# Patient Record
Sex: MALE | Race: WHITE | HISPANIC OR LATINO | Employment: FULL TIME | URBAN - METROPOLITAN AREA
[De-identification: names, ages, dates, MRNs, and addresses within clinical notes are randomized per-mention and may not be internally consistent; named-entity substitution may affect disease eponyms.]

---

## 2017-01-16 ENCOUNTER — TELEPHONE (OUTPATIENT)
Dept: MEDICAL GROUP | Facility: CLINIC | Age: 37
End: 2017-01-16

## 2017-01-16 ENCOUNTER — OFFICE VISIT (OUTPATIENT)
Dept: MEDICAL GROUP | Facility: CLINIC | Age: 37
End: 2017-01-16
Payer: COMMERCIAL

## 2017-01-16 VITALS
TEMPERATURE: 97.9 F | RESPIRATION RATE: 14 BRPM | OXYGEN SATURATION: 96 % | SYSTOLIC BLOOD PRESSURE: 120 MMHG | WEIGHT: 153 LBS | DIASTOLIC BLOOD PRESSURE: 72 MMHG | HEIGHT: 63 IN | HEART RATE: 65 BPM | BODY MASS INDEX: 27.11 KG/M2

## 2017-01-16 DIAGNOSIS — Z28.21 INFLUENZA VACCINATION DECLINED: ICD-10-CM

## 2017-01-16 DIAGNOSIS — K29.00 ACUTE GASTRITIS WITHOUT HEMORRHAGE, UNSPECIFIED GASTRITIS TYPE: ICD-10-CM

## 2017-01-16 DIAGNOSIS — R42 EPISODIC LIGHTHEADEDNESS: ICD-10-CM

## 2017-01-16 PROCEDURE — 99214 OFFICE O/P EST MOD 30 MIN: CPT | Performed by: NURSE PRACTITIONER

## 2017-01-16 RX ORDER — CHLORAL HYDRATE 500 MG
1000 CAPSULE ORAL
COMMUNITY

## 2017-01-16 RX ORDER — OMEPRAZOLE 20 MG/1
20 CAPSULE, DELAYED RELEASE ORAL DAILY
Qty: 30 CAP | Refills: 11 | Status: SHIPPED | OUTPATIENT
Start: 2017-01-16 | End: 2017-01-16 | Stop reason: CLARIF

## 2017-01-16 RX ORDER — OMEPRAZOLE 40 MG/1
40 CAPSULE, DELAYED RELEASE ORAL DAILY
Qty: 30 CAP | Refills: 11 | Status: SHIPPED | OUTPATIENT
Start: 2017-01-16 | End: 2019-07-09

## 2017-01-16 ASSESSMENT — PATIENT HEALTH QUESTIONNAIRE - PHQ9: CLINICAL INTERPRETATION OF PHQ2 SCORE: 2

## 2017-01-16 NOTE — MR AVS SNAPSHOT
"        Phuc Hopeiceto   2017 9:20 AM   Office Visit   MRN: [id number]    Department:  [facility]   Dept Phone:  [phone]    Description:  Male : 1980   Provider:  NIMA Reardon           Reason for Visit     Pain stomach painx 2days [id number]      Allergies as of 2017     No Known Allergies      You were diagnosed with     Acute gastritis without hemorrhage, unspecified gastritis type   [7377518]       Influenza vaccination declined   [275473]         Vital Signs     Blood Pressure Pulse Temperature Respirations Height Weight    120/72 mmHg 65 36.6 °C (97.9 °F) 14 1.6 m (5' 3\") 69.4 kg (153 lb)    Body Mass Index Oxygen Saturation Smoking Status             27.11 kg/m2 96% Never Smoker          Basic Information     Date Of Birth Sex Race Ethnicity Preferred Language    1980 Male White  Origin ([place]oz[place]ican,[place]imbabwean,[place], etc) English      Health Maintenance        Date Due Completion Dates    IMM DTaP/Tdap/Td Vaccine (1 - Tdap) 2000 ---    IMM INFLUENZA (1) 2016 ---            Current Immunizations     No immunizations on file.      Below and/or attached are the medications your provider expects you to take. Review all of your home medications and newly ordered medications with your provider and/or pharmacist. Follow medication instructions as directed by your provider and/or pharmacist. Please keep your medication list with you and share with your provider. Update the information when medications are discontinued, doses are changed, or new medications (including over-the-counter products) are added; and carry medication information at all times in the event of emergency situations     Allergies:  No Known Allergies          Medications  Valid as of: 2017 - 10:19 AM    Generic Name Brand Name Tablet Size Instructions for use    Omega-3 Fatty Acids (Cap) fish oil 1000 MG Take 1,000 mg by mouth 3 times a day, with meals.       " Omeprazole (CAPSULE DELAYED RELEASE) PRILOSEC 20 MG Take 1 Cap by mouth every day.        .                 Medicines prescribed today were sent to:     [id number] - [facility] [place] - [address]    [address] [address]    Phone: [phone] Fax: [phone]    Open 24 Hours?: No      Medication refill instructions:       If your prescription bottle indicates you have medication refills left, it is not necessary to call your provider’s office. Please contact your pharmacy and they will refill your medication.    If your prescription bottle indicates you do not have any refills left, you may request refills at any time through one of the following ways: The online iRise system (except Urgent Care), by calling your provider’s office, or by asking your pharmacy to contact your provider’s office with a refill request. Medication refills are processed only during regular business hours and may not be available until the next business day. Your provider may request additional information or to have a follow-up visit with you prior to refilling your medication.   *Please Note: Medication refills are assigned a new Rx number when refilled electronically. Your pharmacy may indicate that no refills were authorized even though a new prescription for the same medication is available at the pharmacy. Please request the medicine by name with the pharmacy before contacting your provider for a refill.           iRise Access Code: Activation code not generated  Current iRise Status: Active

## 2017-01-16 NOTE — PROGRESS NOTES
"CC: Pain        HPI:     Phuc presents today for the following:  Patient here to establish care.  Transfer from  prev pcp  All problems are new to me today  Patient's past medical, family, and social history reviewed and placed in Epic today. Immunizations reviewed. Prescriptions-reviewed and none daily.     1. Acute gastritis without hemorrhage, unspecified gastritis type/Episodic lightheadedness  Patient is here today stating that the last 2 weeks he's had 2 episodes of significant binge drinking. Both events followed with several days of some nausea, a GI sensitivity, loose stools and several hours of lightheadedness.  First episode occurred about 2 weeks ago. He states 1-2 days after his episode of binge drinking he went back to work. Didn't really eat anything all day to the stomach was bothering him. [place] lightheaded at the end of the day. Drank a Coca-Cola because they thought he had low blood sugar or low blood pressure and felt a little better. But his symptoms persisted so he went to the ER for the [place]. He was diagnosed with \"food poisoning\" at that time. Given the pill for diarrhea and a pill for nausea (\"something that felt under his tongue \") and sent home. They did do blood work there. He did not receive IV fluids. Everything resolved after that.    On 1/7 he had an office party. He can drink in excess of hard liquor, tequila and vodka. The next day didn't really eat anything since stomach bothered him. Did throw up however did not include any blood or coffee grounds. Third day he did need to go back to work. Didn't really drink or eat much because of stomach bothered him but he did have a spicy soup. He knows that he bruises very sensitive to spicy foods. His stomach did bother him. He took some Tums and some water which did help slightly with his stomach. Again got dizzy at the end of the day. Improved with a Coca-Cola.  Now fully resolved however his stomach is still slightly squeamish.    Had " "3 slightly watery stools yesterday. One was dark green. Is taking Pepto-Bismol. Taking over-the-counter unknown medication for diarrhea.  Yesterday disabled. Follow over it. This did bother his stomach.      Did have blood work done several times over the last 6-12 months WITHIN normal limits including blood sugar liver kidneys and cholesterol    Current Outpatient Prescriptions   Medication Sig Dispense Refill   • Omega-3 Fatty Acids (FISH OIL) 1000 MG Cap capsule Take 1,000 mg by mouth 3 times a day, with meals.     • omeprazole (PRILOSEC) 20 MG delayed-release capsule Take 1 Cap by mouth every day. 30 Cap 11     No current facility-administered medications for this visit.     Social History   Substance Use Topics   • Smoking status: Never Smoker    • Smokeless tobacco: Never Used   • Alcohol Use: 3.6 oz/week     6 Cans of beer per week     I reviewed patients allergies, problem list and medications today in EPIC.    ROS: Any/all pertinent positives listed in the HPI, otherwise all others reviewed are negative today.      /72 mmHg  Pulse 65  Temp(Src) 36.6 °C (97.9 °F)  Resp 14  Ht 1.6 m (5' 3\")  Wt 69.4 kg (153 lb)  BMI 27.11 kg/m2  SpO2 96%    Exam:   Gen: Alert and oriented, No apparent distress. WDWN  Psych: A+Ox3, normal affect and mood  Skin: Warm, dry and intact. Good turgor   No rashes in visible areas.  Eye: Conjunctiva clear, lids normal  ENMT: Lips without lesions, good dentition  Neck: No Lymphadenopathy, Thyromegaly, Bruits.   Trachea midline, no masses  Lungs: Clear to auscultation bilaterally, no rales or rhonchi   Unlabored respiratory effort.   CV: Regular rate and rhythm, S1, S2. No murmurs.   No Edema  Abd: Soft non tender, non distended. Normal active bowel sounds.    No Hepatosplenomegaly, No pulsatile masses.   Mild left lower quadrant discomfort with deep palpation. Last bowel movement this morning. States with solid      Assessment and Plan.   36 y.o. male with the following " issues.    1. Acute gastritis without hemorrhage, unspecified gastritis type  Stable. Long conversation of diet. Currently would like him on a light and bland diet. Omeprazole daily for at least 2 weeks. Suggest he admit spicy foods altogether. Cut out caffeine for at least the next 2 weeks. We did talk about ER pre-caution black or bloody stools-. If he is not improving he'll contact me and we'll set him to GI for endoscopy.  Requesting notes from [facility]  - omeprazole (PRILOSEC) 40 MG delayed-release capsule; Take 1 Cap by mouth every day.  Dispense: 30 Cap; Refill: 11    2. Episodic lightheadedness  Appears to be limited to the setting of his alcohol binging. Self resolved.    3. Influenza vaccination declined

## 2017-01-16 NOTE — Clinical Note
[facility]  NIMA Reardon  [address] [address]  [place] [address]  Fax: [phone] Authorization for Release/Disclosure of Protected Health Information   Name: PHUC DUVALL : 1980 SSN: [id number]   Address: [address] [place] [address] Phone:    [phone] (home)    I authorize the entity listed below to release/disclose the PHI below to [facility]/NIMA Reardon   Provider or Entity Name:       Address   City, State, [facility]   Phone:      Fax:     Reason for request: continuity of care   Information to be released:    [  ] LAST COLONOSCOPY, including any PATH REPORT [  ] LAST DEXA  [  ] LAST MAMMOGRAM  [  ] LAST PAP [  ] RETINA EXAM REPORT  [  ] IMMUNIZATION RECORDS  [  ] Release all info      [  ] Check here and initial the line next to each item to release ALL health information INCLUDING  _____ Care and treatment for drug and / or alcohol abuse  _____ HIV testing, infection status, or AIDS  _____ Genetic Testing    DATES OF SERVICE OR TIME PERIOD TO BE DISCLOSED: _____________  I understand and acknowledge that:  * This Authorization may be revoked at any time by you in writing, except if your health information has already been used or disclosed.  * Your health information that will be used or disclosed as a result of you signing this authorization could be re-disclosed by the recipient. If this occurs, your re-disclosed health information may no longer be protected by State or Federal laws.  * You may refuse to sign this Authorization. Your refusal will not affect your ability to obtain treatment.  * This Authorization becomes effective upon signing and will  on (date) __________. If no date is indicated, this Authorization will  one (1) year from the signature date.    Name: Phuc Duvall    Signature:     Date: 2017

## 2017-10-13 ENCOUNTER — OFFICE VISIT (OUTPATIENT)
Dept: URGENT CARE | Facility: PHYSICIAN GROUP | Age: 37
End: 2017-10-13
Payer: COMMERCIAL

## 2017-10-13 VITALS
HEART RATE: 84 BPM | TEMPERATURE: 98.8 F | OXYGEN SATURATION: 94 % | SYSTOLIC BLOOD PRESSURE: 120 MMHG | RESPIRATION RATE: 20 BRPM | HEIGHT: 63 IN | BODY MASS INDEX: 27.46 KG/M2 | WEIGHT: 155 LBS | DIASTOLIC BLOOD PRESSURE: 80 MMHG

## 2017-10-13 DIAGNOSIS — J30.9 ALLERGIC RHINITIS, UNSPECIFIED CHRONICITY, UNSPECIFIED SEASONALITY, UNSPECIFIED TRIGGER: ICD-10-CM

## 2017-10-13 DIAGNOSIS — J01.10 ACUTE NON-RECURRENT FRONTAL SINUSITIS: ICD-10-CM

## 2017-10-13 PROCEDURE — 99214 OFFICE O/P EST MOD 30 MIN: CPT | Performed by: NURSE PRACTITIONER

## 2017-10-13 RX ORDER — AMOXICILLIN AND CLAVULANATE POTASSIUM 875; 125 MG/1; MG/1
1 TABLET, FILM COATED ORAL 2 TIMES DAILY
Qty: 14 TAB | Refills: 0 | Status: SHIPPED | OUTPATIENT
Start: 2017-10-13 | End: 2017-10-20

## 2017-10-13 RX ORDER — FLUTICASONE PROPIONATE 50 MCG
1 SPRAY, SUSPENSION (ML) NASAL 2 TIMES DAILY
Qty: 16 G | Refills: 0 | Status: SHIPPED | OUTPATIENT
Start: 2017-10-13 | End: 2020-09-14

## 2017-10-13 RX ORDER — OMEPRAZOLE 20 MG/1
20 CAPSULE, DELAYED RELEASE ORAL
Refills: 11 | COMMUNITY
Start: 2017-10-06 | End: 2018-02-17

## 2017-10-14 NOTE — PROGRESS NOTES
Chief Complaint   Patient presents with   • Cough     congestion, sinus's, fever, eye watering x 5 days        HISTORY OF PRESENT ILLNESS: Patient is a 37 y.o. male who presents today due to two weeks of nasal congestion, headache, and and sinus pressure. Admits to associated fever, chills, malaise for the past week. He denies associated cough, difficulty breathing, confusion, nausea, vomiting or diarrhea. He has tried OTC cold/sinus medication at home without much improvement. He admits to a history of seasonal allergies in the past. No known ill contacts at home. No recent antibiotic usage.     There are no active problems to display for this patient.      Allergies:Review of patient's allergies indicates no known allergies.    Current Outpatient Prescriptions Ordered in [facility]   Medication Sig Dispense Refill   • amoxicillin-clavulanate (AUGMENTIN) 875-125 MG Tab Take 1 Tab by mouth 2 times a day for 7 days. 14 Tab 0   • fluticasone (FLONASE) 50 MCG/ACT nasal spray Spray 1 Spray in nose 2 times a day. 16 g 0   • omeprazole (PRILOSEC) 20 MG delayed-release capsule Take 20 mg by mouth every day. TAKE 1 CAP BY MOUTH EVERY DAY.  11   • Omega-3 Fatty Acids (FISH OIL) 1000 MG Cap capsule Take 1,000 mg by mouth 3 times a day, with meals.     • omeprazole (PRILOSEC) 40 MG delayed-release capsule Take 1 Cap by mouth every day. 30 Cap 11     No current [facility]-ordered facility-administered medications on file.        Past Medical History:   Diagnosis Date   • Asthma     as a child   • GERD (gastroesophageal reflux disease)     tums       Social History   Substance Use Topics   • Smoking status: Never Smoker   • Smokeless tobacco: Never Used   • Alcohol use 3.6 oz/week     6 Cans of beer per week       Family Status   Relation Status   • Mother Alive   • Father Alive   • Brother Alive   • Maternal Grandmother    • Maternal Grandfather    • Paternal Grandmother    • Paternal Grandfather      Family  "History   Problem Relation Age of Onset   • Diabetes Mother    • Hyperlipidemia Mother    • Kidney stones Father    • GI Paternal Grandmother      ulcer       ROS:  Review of Systems   Constitutional: Positive for fever, chills, fatigue. Negative for weight loss.   HENT: Positive for frontal sinus pressure, sore throat, nasal congestion. Negative for ear pain, nosebleeds, neck pain.    Eyes: Negative for vision changes.   Cardiovascular: Negative for chest pain, palpitations, orthopnea and leg swelling.   Respiratory: Negative for cough, sputum production, shortness of breath and wheezing.   Gastrointestinal: Negative for abdominal pain, nausea, vomiting or diarrhea.    Skin: Negative for rash, diaphoresis.     Exam:  Blood pressure 120/80, pulse 84, temperature 37.1 °C (98.8 °F), resp. rate 20, height 1.6 m (5' 3\"), weight 70.3 kg (155 lb), SpO2 94 %.  General: well-nourished, well-developed male in NAD  Head: normocephalic, atraumatic  Eyes: PERRLA, no conjunctival injection, acuity grossly intact, lids normal.  Ears: normal shape and symmetry, no tenderness, no discharge. External canals are without any significant edema or erythema. Tympanic membranes are without any inflammation, no effusion. Gross auditory acuity is intact.  Nose: symmetrical without tenderness, erythema and swelling noted bilateral turbinates, clear discharge.   Mouth/Throat: reasonable hygiene, no exudates or tonsillar enlargement. Erythema is present.   Neck: no masses, range of motion within normal limits, no tracheal deviation. No obvious thyroid enlargement.   Lymph: no cervical adenopathy. No supraclavicular adenopathy.   Neuro: alert and oriented. Cranial nerves 1-12 grossly intact. No sensory deficit.   Cardiovascular: regular rate and rhythm. No edema.  Pulmonary: no distress. Chest is symmetrical with respiration, no wheezes, crackles, or rhonchi.   Musculoskeletal: no clubbing, appropriate muscle tone, gait is stable.  Skin: warm, " dry, intact, no clubbing, no cyanosis, no rashes.   Psych: appropriate mood, affect, judgement.         Assessment/Plan:  1. Acute non-recurrent frontal sinusitis  amoxicillin-clavulanate (AUGMENTIN) 875-125 MG Tab    fluticasone (FLONASE) 50 MCG/ACT nasal spray   2. Allergic rhinitis, unspecified chronicity, unspecified seasonality, unspecified trigger             Antibiotic as directed, potential side effects of medication discussed. Flonase as directed. Daily allergy medication.   Nasal washes with sterile saline solution daily. Sleep with HOB elevated, humidifier at night, rest, increase fluid intake.   Supportive care, differential diagnoses, and indications for immediate follow-up discussed with patient.   Pathogenesis of diagnosis discussed including typical length and natural progression.   Instructed to return to clinic or nearest emergency department for any change in condition, further concerns, or worsening of symptoms.  Patient states understanding of the plan of care and discharge instructions.  Instructed to make an appointment, for follow up, with their primary care provider.        Please note that this dictation was created using voice recognition software. I have made every reasonable attempt to correct obvious errors, but I expect that there are errors of grammar and possibly content that I did not discover before finalizing the note.      ABENA Flower.

## 2018-02-17 DIAGNOSIS — K29.00 ACUTE GASTRITIS WITHOUT HEMORRHAGE, UNSPECIFIED GASTRITIS TYPE: ICD-10-CM

## 2018-02-17 RX ORDER — OMEPRAZOLE 20 MG/1
20 CAPSULE, DELAYED RELEASE ORAL DAILY
Qty: 30 CAP | Refills: 8 | Status: SHIPPED | OUTPATIENT
Start: 2018-02-17 | End: 2019-07-09 | Stop reason: SDUPTHER

## 2018-04-06 ENCOUNTER — OCCUPATIONAL MEDICINE (OUTPATIENT)
Dept: URGENT CARE | Facility: PHYSICIAN GROUP | Age: 38
End: 2018-04-06
Payer: COMMERCIAL

## 2018-04-06 VITALS
OXYGEN SATURATION: 98 % | RESPIRATION RATE: 16 BRPM | HEIGHT: 63 IN | WEIGHT: 165 LBS | SYSTOLIC BLOOD PRESSURE: 118 MMHG | BODY MASS INDEX: 29.23 KG/M2 | HEART RATE: 82 BPM | DIASTOLIC BLOOD PRESSURE: 76 MMHG | TEMPERATURE: 97.2 F

## 2018-04-06 DIAGNOSIS — S61.412A LACERATION OF LEFT HAND WITHOUT FOREIGN BODY, INITIAL ENCOUNTER: ICD-10-CM

## 2018-04-06 LAB
BREATH ALCOHOL COMMENT: NORMAL
POC BREATHALIZER: 0 PERCENT (ref 0–0.01)

## 2018-04-06 PROCEDURE — 82075 ASSAY OF BREATH ETHANOL: CPT | Performed by: FAMILY MEDICINE

## 2018-04-06 PROCEDURE — 90715 TDAP VACCINE 7 YRS/> IM: CPT | Performed by: FAMILY MEDICINE

## 2018-04-06 PROCEDURE — 7503 PR ESCREEN ACCT UDS COL ONLY: Performed by: FAMILY MEDICINE

## 2018-04-06 PROCEDURE — 90471 IMMUNIZATION ADMIN: CPT | Performed by: FAMILY MEDICINE

## 2018-04-06 PROCEDURE — 12001 RPR S/N/AX/GEN/TRNK 2.5CM/<: CPT | Performed by: FAMILY MEDICINE

## 2018-04-06 NOTE — LETTER
[facility] [address] [address]  Phone:  [phone] - Fax:  [phone]   [facility] Progress Report and Disability Certification  Date of Service: 4/6/2018   No Show:  No  Date / Time of Next Visit: 4/17/2018   Claim Information   Patient Name: Phuc Duvall  Claim Number:     Employer: [employer]  Date of Injury: 4/6/2018     Insurer / TPA: [employer]  ID / SSN:     Occupation: fork   Diagnosis: The encounter diagnosis was Laceration of left hand without foreign body, initial encounter.    Medical Information   Related to Industrial Injury? Yes    Subjective Complaints:  DOI: 4/6/2018  Laceration over left 2nd MCP joint. Caused by striking against sharp metal today at work. Bleeding controlled with direct pressure. No foreign body. No function or sensory loss. No prior injury. No second job or outside activity contributing.    Objective Findings: L hand: 2cm flap laceration dorsum over 2nd MCP joint. No deep structure involvement. No FB Distal neuro/vascular intact.      Pre-Existing Condition(s):     Assessment:   Initial Visit    Status: Additional Care Required  Permanent Disability:No    Plan:   Comments:laceration repair, wound care    Diagnostics:   Comments:NA    Comments:       Disability Information   Status: Released to Full Duty    From:  4/6/2018  Through: 4/17/2018 Restrictions are:     Physical Restrictions   Sitting:    Standing:    Stooping:    Bending:      Squatting:    Walking:    Climbing:    Pushing:      Pulling:    Other:    Reaching Above Shoulder (L):   Reaching Above Shoulder (R):       Reaching Below Shoulder (L):    Reaching Below Shoulder (R):      Not to exceed Weight Limits   Carrying(hrs):   Weight Limit(lb):   Lifting(hrs):   Weight  Limit(lb):     Comments:      Repetitive Actions   Hands: i.e. Fine Manipulations from Grasping:     Feet: i.e. Operating Foot Controls:     Driving / Operate  Machinery:     Physician Name: Artis Min M.D. Physician Signature: ARTIS Ballesteros M.D. e-Signature: Dr. Tomi Dixon, Medical Director   Clinic Name / Location: [address] [address] Clinic Phone Number: Dept: [phone]   Appointment Time: 5:00 Pm Visit Start Time: 6:20 PM   Check-In Time:  5:24 Pm Visit Discharge Time:  7:39 PM   Original-Treating Physician or Chiropractor    Page 2-Insurer/TPA    Page 3-Employer    Page 4-Employee

## 2018-04-06 NOTE — LETTER
"EMPLOYEE’S CLAIM FOR COMPENSATION/ REPORT OF INITIAL TREATMENT  FORM C-4    EMPLOYEE’S CLAIM - PROVIDE ALL INFORMATION REQUESTED   First Name  Phuc Last Name  Jadiel Birthdate                    1980                Sex  male Claim Number   Home Address  [address] Age  37 y.o. 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 (if applicable)  pulling boxes    How did this injury or occupational disease occur? (Be specific an answer in detail. Use additional sheet if necessary)  pulling boxes and pull a box and hit on a sharp metal   If you believe that you have an occupational disease, when did you first have knowledge of the disability and it relationship to your employment?   Witnesses to the Accident        Nature of Injury or Occupational Disease  Laceration  Part(s) of Body Injured or Affected  Finger (L), ,     I certify that the above is true and correct to the best of my knowledge and that I have provided this information in order to obtain the benefits of [place] inclusive [place]  I hereby authorize any physician, chiropractor, surgeon, practitioner, or other person, any hospital, including [facility] or [facility] hospital, any medical service organization, any insurance company, or other institution or organization to release to each other, any medical or other information, including benefits paid or payable, pertinent to this injury or disease, except information relative to diagnosis, treatment and/or counseling for AIDS, psychological conditions, alcohol or controlled substances, for which I must give specific authorization.  A Photostat of this authorization shall be as valid as the original.     Date   Place   Employee’s Signature   THIS REPORT MUST BE COMPLETED AND MAILED WITHIN 3 WORKING DAYS OF TREATMENT   Place  [facility]  Name of Facility  [facility]   Date  4/6/2018 Diagnosis  (S61.412A) Laceration of left hand without foreign body, initial encounter Is there evidence the injured employee was under the influence of alcohol and/or another controlled substance at the time of accident?   Hour  6:20 PM Description of Injury or Disease  The encounter diagnosis was Laceration of left hand without foreign body, initial encounter. No   Treatment  Laceration repair, wound care  Have you advised the patient to remain off work five days or more? No   X-Ray Findings    Comments:NA   If Yes   From Date  To Date      From information given by the employee, together with medical evidence, can you directly connect this injury or occupational disease as job incurred?  Yes If No Full Duty  Yes Modified Duty      Is additional medical care by a physician indicated?  Yes If Modified Duty, Specify any Limitations / Restrictions      Do you know of any previous injury or disease contributing to this condition or occupational disease?                            No   Date  4/6/2018 Print Doctor’s Name Artis Min M.D. I certify  "the employer’s copy of  this form was mailed on:   Address  [address]  [facility] [address] [facility] [employer]’s Use [employer]     [facility] Zip  [address]    Provider’s Tax ID Number  [id number] Telephone  Dept: [phone]        DEBRA Ballesteros M.D.   e-Signature: Dr. Tomi Dixon, Medical Director Degree  MD        ORIGINAL-TREATING PHYSICIAN OR CHIROPRACTOR    PAGE 2-INSURER/TPA    PAGE 3-EMPLOYER    PAGE 4-EMPLOYEE             Form C-4 (rev10/07)              BRIEF DESCRIPTION OF RIGHTS AND BENEFITS  (Pursuant to [place] 616C.050)    Notice of Injury or Occupational Disease (Incident Report Form C-1): If an injury or occupational disease (OD) arises out of and in the  course of employment, you must provide written notice to your employer as soon as practicable, but no later than 7 days after the accident or  OD. Your employer shall maintain a sufficient supply of the required forms.    Claim for Compensation (Form C-4): If medical treatment is sought, the form C-4 is available at the place of initial treatment. A completed  \"Claim for Compensation\" (Form C-4) must be filed within 90 days after an accident or OD. The treating physician or chiropractor must,  within 3 working days after treatment, complete and mail to the employer, the employer's insurer and third-party , the Claim for  Compensation.    Medical Treatment: If you require medical treatment for your on-the-job injury or OD, you may be required to select a physician or  chiropractor from a list provided by your workers’ compensation insurer, if it has contracted with an Organization for Managed Care (MCO) or  Preferred Provider Organization (PPO) or providers of health care. If your employer has not entered into a contract with an MCO or PPO, you  may select a physician or chiropractor from the Panel of Physicians and Chiropractors. Any medical costs related to your industrial injury or  OD will be paid by your insurer.    Temporary Total " Disability (TTD): If your doctor has certified that you are unable to work for a period of at least 5 consecutive days, or 5  cumulative days in a 20-day period, or places restrictions on you that your employer does not accommodate, you may be entitled to TTD  compensation.    Temporary Partial Disability (TPD): If the wage you receive upon reemployment is less than the compensation for TTD to which you are  entitled, the insurer may be required to pay you TPD compensation to make up the difference. TPD can only be paid for a maximum of 24  months.    Permanent Partial Disability (PPD): When your medical condition is stable and there is an indication of a PPD as a result of your injury or  OD, within 30 days, your insurer must arrange for an evaluation by a rating physician or chiropractor to determine the degree of your PPD. The  amount of your PPD award depends on the date of injury, the results of the PPD evaluation and your age and wage.    Permanent Total Disability (PTD): If you are medically certified by a treating physician or chiropractor as permanently and totally disabled  and have been granted a PTD status by your insurer, you are entitled to receive monthly benefits not to exceed 66 2/3% of your average  monthly wage. The amount of your PTD payments is subject to reduction if you previously received a PPD award.    Vocational Rehabilitation Services: You may be eligible for vocational rehabilitation services if you are unable to return to the job due to a  permanent physical impairment or permanent restrictions as a result of your injury or occupational disease.    Transportation and Per Willis Reimbursement: You may be eligible for travel expenses and per willis associated with medical treatment.    Reopening: You may be able to reopen your claim if your condition worsens after claim closure.    Appeal Process: If you disagree with a written determination issued by the insurer or the insurer does not  respond to your request, you may  appeal to the Department of Administration, , by following the instructions contained in your determination letter. You must  appeal the determination within 70 days from the date of the determination letter at [address], [address], [place], [place]  [address], or [address], [address], [place], [place] [address]. If you disagree with the  decision, you may appeal to the  Department of Administration, . You must file your appeal within 30 days from the date of the  decision  letter at [address], [address], [place], [place] [address], or [address], [address], [place], [place] [address]. If you  disagree with a decision of an , you may file a petition for judicial review with the District Court. You must do so within 30  days of the Appeal Officer’s decision. You may be represented by an  at your own expense or you may contact the [facility] for possible  representation.    [place]  for Injured Workers (NAIW): If you disagree with a  decision, you may request that NAIW represent you  without charge at an  Hearing. For information regarding denial of benefits, you may contact the [facility] at: [address], [address], [place] [address], [phone], or [address], [place] [address], [phone]    To File a Complaint with the Division: If you wish to file a complaint with the  of the Division of Industrial Relations (DIR),  please contact the Workers’ Compensation Section, [address], [address], [place], [place] [address], telephone [phone]) [phone], or  [address], [place] [address], telephone [phone]) [phone].    For assistance with Workers’ Compensation Issues: you may contact the Office of the [facility] Consumer Health Assistance, [address]  [facility], [address], [place], [place] [address], Toll Free 1-414.753.6599, Web site: http://lynsey.[facility].nv., E-mail  [email or web address].[email or web address].                                                                                                                                                                                                                                   __________________________________________________________________                                                                   _________________                Employee Name / Signature                                                                                                                                                       Date                                                                                                                                                                                                     D-2 (rev. 10/07)

## 2018-04-17 ENCOUNTER — OCCUPATIONAL MEDICINE (OUTPATIENT)
Dept: URGENT CARE | Facility: PHYSICIAN GROUP | Age: 38
End: 2018-04-17
Payer: COMMERCIAL

## 2018-04-17 VITALS
TEMPERATURE: 97.3 F | RESPIRATION RATE: 16 BRPM | HEART RATE: 100 BPM | WEIGHT: 165 LBS | HEIGHT: 63 IN | BODY MASS INDEX: 29.23 KG/M2 | OXYGEN SATURATION: 94 %

## 2018-04-17 DIAGNOSIS — S61.412D LACERATION OF LEFT HAND WITHOUT FOREIGN BODY, SUBSEQUENT ENCOUNTER: ICD-10-CM

## 2018-04-17 PROCEDURE — 99213 OFFICE O/P EST LOW 20 MIN: CPT | Performed by: FAMILY MEDICINE

## 2018-04-17 ASSESSMENT — ENCOUNTER SYMPTOMS
BRUISES/BLEEDS EASILY: 0
FOCAL WEAKNESS: 0
SENSORY CHANGE: 0
FOCAL WEAKNESS: 0
BRUISES/BLEEDS EASILY: 0
SENSORY CHANGE: 0

## 2018-04-17 NOTE — PROGRESS NOTES
"Subjective:      Phuc Duvall is a 37 y.o. male who presents with Work-Related Injury (W/C  follow up. L pointer finger lac)      DOI: 4/6/2018  Laceration over left 2nd MCP joint. Caused by striking against sharp metal. Seen initially and sutured. Presents today for suture removal. No bleeding. No discharge. No pain. No function loss.      HPI    Review of Systems   Musculoskeletal: Negative for joint pain.   Skin: Negative for itching and rash.   Neurological: Negative for sensory change and focal weakness.   Endo/Heme/Allergies: Does not bruise/bleed easily.     .  Map       Objective:     Pulse 100   Temp 36.3 °C (97.3 °F)   Resp 16   Ht 1.6 m (5' 3\")   Wt 74.8 kg (165 lb)   SpO2 94%   BMI 29.23 kg/m²      Physical Exam   Constitutional: He appears well-developed and well-nourished. No distress.   Neurological:   Speech is clear. Patient is appropriate and cooperative.         L hand: well healing laceration dorsum L MCP joint. 3 interrupted sutures removed without difficulty. Distal neuro/vascular intact.          Assessment/Plan:     1. Laceration of left hand without foreign body, subsequent encounter    Suture removed  Doing well  Discharge MMI    "

## 2018-04-17 NOTE — LETTER
[facility] [address] [address]  Phone:  [phone] - Fax:  [phone]   [facility] Progress Report and Disability Certification  Date of Service: 4/17/2018   No Show:  No  Date / Time of Next Visit:  Discharge MMI   Claim Information   Patient Name: Phuc Duvall  Claim Number:     Employer: [employer]  Date of Injury: 4/6/2018     Insurer / TPA: Jett  ID / SSN:     Occupation: fork   Diagnosis: The encounter diagnosis was Laceration of left hand without foreign body, subsequent encounter.    Medical Information   Related to Industrial Injury? Yes    Subjective Complaints:  DOI: 4/6/2018  Laceration over left 2nd MCP joint. Caused by striking against sharp metal. Seen initially and sutured. Presents today for suture removal. No bleeding. No discharge. No pain. No function loss.    Objective Findings: L hand: well healing laceration dorsum L MCP joint. 3 interrupted sutures removed without difficulty. Distal neuro/vascular intact.      Pre-Existing Condition(s):     Assessment:   Condition Improved    Status: Discharged /  MMI  Permanent Disability:No    Plan:      Diagnostics:      Comments:       Disability Information   Status: Released to Full Duty    From:  4/17/2018  Through:   Restrictions are:     Physical Restrictions   Sitting:    Standing:    Stooping:    Bending:      Squatting:    Walking:    Climbing:    Pushing:      Pulling:    Other:    Reaching Above Shoulder (L):   Reaching Above Shoulder (R):       Reaching Below Shoulder (L):    Reaching Below Shoulder (R):      Not to exceed Weight Limits   Carrying(hrs):   Weight Limit(lb):   Lifting(hrs):   Weight  Limit(lb):     Comments:      Repetitive Actions   Hands: i.e. Fine Manipulations from Grasping:     Feet: i.e. Operating Foot Controls:     Driving / Operate Machinery:     Physician Name: Debra Min M.D. Physician Signature: DEBRA Ballesteros M.D.  e-Signature: Dr. Tomi Dixon, Medical Director   Clinic Name / Location: [address] [address] Clinic Phone Number: Dept: [phone]   Appointment Time: 8:00 Am Visit Start Time: 8:17 AM   Check-In Time:  8:09 Am Visit Discharge Time:  0841   Original-Treating Physician or Chiropractor    Page 2-Insurer/TPA    Page 3-Employer    Page 4-Employee

## 2018-04-17 NOTE — PROGRESS NOTES
"Subjective:      Phuc Duvall is a 37 y.o. male who presents with Hand Injury (left back of hand above 1st finger, laceration)      DOI: 4/6/2018  Laceration over left 2nd MCP joint. Caused by striking against sharp metal today at work. Bleeding controlled with direct pressure. No foreign body. No function or sensory loss. No prior injury. No second job or outside activity contributing.      HPI    Review of Systems   Musculoskeletal: Negative for joint pain.   Skin: Negative for itching and rash.   Neurological: Negative for sensory change and focal weakness.   Endo/Heme/Allergies: Does not bruise/bleed easily.          Objective:     /76   Pulse 82   Temp 36.2 °C (97.2 °F)   Resp 16   Ht 1.6 m (5' 3\")   Wt 74.8 kg (165 lb)   SpO2 98%   BMI 29.23 kg/m²      Physical Exam   Constitutional: He appears well-developed and well-nourished. No distress.   HENT:   Head: Normocephalic and atraumatic.   Neurological:   Speech is clear. Patient is appropriate and cooperative.     Skin: Skin is warm and dry. No rash noted.       L hand: 2cm flap laceration dorsum over 2nd MCP joint. No deep structure involvement. No FB Distal neuro/vascular intact.   .  Procedure: Laceration Repair  -Risks including bleeding, nerve damage, infection, and poor cosmetic outcome discussed at length. Benefits and alternatives discussed.   -Sterile technique throughout  -Local anesthesia with 2% lidocaine  -Closed with #3 4-0 Nylon interrupted sutures with good wound approximation  -Polysporin and dressing placed  -Patient tolerated well             Assessment/Plan:     1. Laceration of left hand without foreign body, initial encounter    - Tdap =>8yo IM  - POCT Breath Alcohol Test    Wound care  SR 10 days      "

## 2019-04-19 ENCOUNTER — OCCUPATIONAL MEDICINE (OUTPATIENT)
Dept: URGENT CARE | Facility: PHYSICIAN GROUP | Age: 39
End: 2019-04-19
Payer: COMMERCIAL

## 2019-04-19 VITALS
SYSTOLIC BLOOD PRESSURE: 118 MMHG | BODY MASS INDEX: 27.49 KG/M2 | HEART RATE: 78 BPM | DIASTOLIC BLOOD PRESSURE: 66 MMHG | WEIGHT: 161 LBS | TEMPERATURE: 98.1 F | HEIGHT: 64 IN | OXYGEN SATURATION: 97 % | RESPIRATION RATE: 16 BRPM

## 2019-04-19 DIAGNOSIS — S29.019A THORACIC MYOFASCIAL STRAIN, INITIAL ENCOUNTER: ICD-10-CM

## 2019-04-19 PROCEDURE — 99214 OFFICE O/P EST MOD 30 MIN: CPT | Performed by: FAMILY MEDICINE

## 2019-04-19 NOTE — LETTER
"EMPLOYEE’S CLAIM FOR COMPENSATION/ REPORT OF INITIAL TREATMENT  FORM C-4    EMPLOYEE’S CLAIM - PROVIDE ALL INFORMATION REQUESTED   First Name  Phuc Last Name  Jadiel Birthdate                    1980                Sex  male Claim Number   Home Address  [address] Age  38 y.o. Height  1.626 m (5' 4\") Weight  73 kg (161 lb) N     [place] Zip  [address] Telephone  [phone] (home)    Mailing Address  [address] [place]  [address] Primary Language Spoken  English    Insurer  Jett Third Party   [employer]   Employee's Occupation (Job Title) When Injury or Occupational Disease Occurred      Employer's Name    [employer] Telephone   [phone]   Employer Address   [address] [facility] Zip   [address]   Date of Injury  4/17/2019               Hour of Injury  5:15 PM Date Employer Notified  4/19/2019 Last Day of Work after Injury or Occupational Disease  4/19/2019 Supervisor to Whom Injury Reported  Alli Herrera   Address or Location of Accident (if applicable)  [[address]]   What were you doing at the time of accident? (if applicable)  lifted Boxes    How did this injury or occupational disease occur? (Be specific an answer in detail. Use additional sheet if necessary)  lifting boxes my entire shift   If you believe that you have an occupational disease, when did you first have knowledge of the disability and it relationship to your employment?   Witnesses to the Accident  none      Nature of Injury or Occupational Disease  Strain  Part(s) of Body Injured or Affected  Upper Back Area (Thoracic Area), ,     I certify that the above is true and correct to the best of my knowledge and that I have provided this information in order to obtain the benefits of [place] " inclusive or [place]).  I hereby authorize any physician, chiropractor, surgeon, practitioner, or other person, any hospital, including [facility] or [facility] hospital, any medical service organization, any insurance company, or other institution or organization to release to each other, any medical or other information, including benefits paid or payable, pertinent to this injury or disease, except information relative to diagnosis, treatment and/or counseling for AIDS, psychological conditions, alcohol or controlled substances, for which I must give specific authorization.  A Photostat of this authorization shall be as valid as the original.     Date   Place   Employee’s Signature   THIS REPORT MUST BE COMPLETED AND MAILED WITHIN 3 WORKING DAYS OF TREATMENT   Place  [facility]  Name of Facility  [facility]   Date  4/19/2019 Diagnosis  (S29.019A) Thoracic myofascial strain, initial encounter Is there evidence the injured employee was under the influence of alcohol and/or another controlled substance at the time of accident?   Hour  3:14 PM Description of Injury or Disease  The encounter diagnosis was Thoracic myofascial strain, initial encounter. No   Treatment  Thoracic myofascial strain, initial encounter     Otc motrin, prn  Restrictions per D39  F/u 3-5 d    Have you advised the patient to remain off work five days or more? No   X-Ray Findings      If Yes   From Date  To Date      From information given by the employee, together with medical evidence, can you directly connect this injury or occupational disease as job incurred?  Yes If No Full Duty  No Modified Duty  Yes   Is additional medical care by a physician indicated?  Yes If Modified Duty, Specify any Limitations / Restrictions  Restrictions per D39     Do you know of any previous injury or disease contributing to this condition or occupational disease?                            No   Date  4/19/2019 Print  "Doctor’s Name Deangelo Clay M.D. I certify the employer’s copy of  this form was mailed on:   Address  [address] Insurer’s Use Only     [facility] Zip  [address]    Provider’s Tax ID Number  [id number] Telephone  Dept: [phone]        e-DEANGELO Carter M.D.   e-Signature: Dr. Timoteo Gutierrez, Medical Director Degree  MD        ORIGINAL-TREATING PHYSICIAN OR CHIROPRACTOR    PAGE 2-INSURER/TPA    PAGE 3-EMPLOYER    PAGE 4-EMPLOYEE             Form C-4 (rev10/07)              BRIEF DESCRIPTION OF RIGHTS AND BENEFITS  (Pursuant to [place]RS 616C.050)    Notice of Injury or Occupational Disease (Incident Report Form C-1): If an injury or occupational disease (OD) arises out of and in the  course of employment, you must provide written notice to your employer as soon as practicable, but no later than 7 days after the accident or  OD. Your employer shall maintain a sufficient supply of the required forms.    Claim for Compensation (Form C-4): If medical treatment is sought, the form C-4 is available at the place of initial treatment. A completed  \"Claim for Compensation\" (Form C-4) must be filed within 90 days after an accident or OD. The treating physician or chiropractor must,  within 3 working days after treatment, complete and mail to the employer, the employer's insurer and third-party , the Claim for  Compensation.    Medical Treatment: If you require medical treatment for your on-the-job injury or OD, you may be required to select a physician or  chiropractor from a list provided by your workers’ compensation insurer, if it has contracted with an Organization for Managed Care (MCO) or  Preferred Provider Organization (PPO) or providers of health care. If your employer has not entered into a contract with an MCO or PPO, you  may select a physician or chiropractor from the Panel of Physicians and Chiropractors. Any medical costs related to your industrial injury or  OD " will be paid by your insurer.    Temporary Total Disability (TTD): If your doctor has certified that you are unable to work for a period of at least 5 consecutive days, or 5  cumulative days in a 20-day period, or places restrictions on you that your employer does not accommodate, you may be entitled to TTD  compensation.    Temporary Partial Disability (TPD): If the wage you receive upon reemployment is less than the compensation for TTD to which you are  entitled, the insurer may be required to pay you TPD compensation to make up the difference. TPD can only be paid for a maximum of 24  months.    Permanent Partial Disability (PPD): When your medical condition is stable and there is an indication of a PPD as a result of your injury or  OD, within 30 days, your insurer must arrange for an evaluation by a rating physician or chiropractor to determine the degree of your PPD. The  amount of your PPD award depends on the date of injury, the results of the PPD evaluation and your age and wage.    Permanent Total Disability (PTD): If you are medically certified by a treating physician or chiropractor as permanently and totally disabled  and have been granted a PTD status by your insurer, you are entitled to receive monthly benefits not to exceed 66 2/3% of your average  monthly wage. The amount of your PTD payments is subject to reduction if you previously received a PPD award.    Vocational Rehabilitation Services: You may be eligible for vocational rehabilitation services if you are unable to return to the job due to a  permanent physical impairment or permanent restrictions as a result of your injury or occupational disease.    Transportation and Per Willis Reimbursement: You may be eligible for travel expenses and per willis associated with medical treatment.    Reopening: You may be able to reopen your claim if your condition worsens after claim closure.    Appeal Process: If you disagree with a written determination  issued by the insurer or the insurer does not respond to your request, you may  appeal to the Department of Administration, , by following the instructions contained in your determination letter. You must  appeal the determination within 70 days from the date of the determination letter at [address], [address], [place], [place]  [address], or [address], [address], [place], [place] [address]. If you disagree with the  decision, you may appeal to the  Department of Administration, . You must file your appeal within 30 days from the date of the  decision  letter at [address], [address], [place], [place] [address], or [address], [address], [place], [place] [address]. If you  disagree with a decision of an , you may file a petition for judicial review with the District Court. You must do so within 30  days of the Appeal Officer’s decision. You may be represented by an  at your own expense or you may contact the [facility] for possible  representation.    [place]  for Injured Workers (NAIW): If you disagree with a  decision, you may request that NAIW represent you  without charge at an  Hearing. For information regarding denial of benefits, you may contact the [facility] at: [address], [address], [place] [address], [phone], or [address], [address], [place] [address], [phone]    To File a Complaint with the Division: If you wish to file a complaint with the  of the Division of Industrial Relations (DIR),  please contact the Workers’ Compensation Section, [address], [address], [place], [place] [address], telephone [phone], or  [address], [place] [address], telephone [phone]) [phone].    For assistance with Workers’ Compensation Issues: you may contact the Office of the  [facility] Consumer Health Assistance, [address], [address], [place] [address], Toll Free 1-942.110.5582, Web site: http://[email or web address] access [email or web address]., E-mail  [email or web address].                                                                                                                                                                                                                                   __________________________________________________________________                                                                   _________________                Employee Name / Signature                                                                                                                                                       Date                                                                                                                                                                                                     D-2 (rev. 10/07)

## 2019-04-19 NOTE — PROGRESS NOTES
"Chief Complaint   Patient presents with   • Back Pain     mid back pain, from lifiting boxes at work           DOI: 4/17    Back Pain  This is a new problem.  States that he noticed some mid back pain after lifting heavy boxes at work.   The problem occurs constantly. The problem is unchanged. The pain is present in the midspine. The quality of the pain is described as aching and sore. The pain does not radiate. The pain is mild. The symptoms are aggravated by position. Pertinent negatives include no bladder incontinence, bowel incontinence, dysuria, fever, headaches, numbness or weakness. Treatments tried: none.       Social History   Substance Use Topics   • Smoking status: Never Smoker   • Smokeless tobacco: Never Used   • Alcohol use 3.6 oz/week     6 Cans of beer per week       Family hx was reviewed - no pertinent past family hx      Past medical history was unremarkable and not pertinent to current issue    Review of Systems   Constitutional: Negative for fever.   Gastrointestinal: Negative for bowel incontinence.   Genitourinary: Negative for bladder incontinence, dysuria, urgency and frequency.   Musculoskeletal: Positive for back pain.   Neurological: Negative for weakness, numbness and headaches.   All other systems reviewed and are negative.         Objective:     /66 (BP Location: Right arm, Patient Position: Sitting, BP Cuff Size: Adult)   Pulse 78   Temp 36.7 °C (98.1 °F) (Temporal)   Resp 16   Ht 1.626 m (5' 4\")   Wt 73 kg (161 lb)   SpO2 97%     Physical Exam   Constitutional: pt is oriented to person, place, and time. Pt appears well-developed and well-nourished. No distress.   HENT:   Head: Normocephalic and atraumatic.   Eyes: EOM are normal. Pupils are equal, round, and reactive to light. No scleral icterus.   Neck: Normal range of motion. Neck supple. No thyromegaly present.   Cardiovascular: Normal rate, regular rhythm and normal heart sounds.    Pulmonary/Chest: Effort normal and " breath sounds normal. No respiratory distress.  no wheezes.   no rales.  no tenderness.   Musculoskeletal: pt exhibits no edema.           Thoracic spine - there is bilat muscular tenderness, but no spasms       Lumbar spine: pt exhibits no TTP.   Full AROM. Pt exhibits no bony tenderness, no swelling, no edema, no deformity  .   Neurological: patient is alert and oriented to person, place, and time. Patient has normal reflexes. No cranial nerve deficit. Patient exhibits normal muscle tone.      Skin: Skin is warm and dry. No rash noted. No erythema.   Psychiatric: patient has a normal mood and affect.  behavior is normal.   Nursing note and vitals reviewed.              Assessment/Plan:       1. Thoracic myofascial strain, initial encounter     Otc motrin, prn  Restrictions per D39  F/u 3-5 d

## 2019-04-19 NOTE — LETTER
[facility] [address] [place] [address]  Phone:  [phone] - Fax:  [phone]   [facility] Progress Report and Disability Certification  Date of Service: 4/19/2019   No Show:  No  Date / Time of Next Visit: 4/26/2019   Claim Information   Patient Name: Phuc Duvall  Claim Number:     Employer:   [employer] Date of Injury: 4/17/2019     Insurer / TPA: Jett  ID / SSN:     Occupation:   Diagnosis: The encounter diagnosis was Thoracic myofascial strain, initial encounter.    Medical Information   Related to Industrial Injury? Yes    Subjective Complaints:        DOI: 4/17    Back Pain  This is a new problem.  States that he noticed some mid back pain after lifting heavy boxes at work.   The problem occurs constantly. The problem is unchanged. The pain is present in the midspine. The quality of the pain is described as aching and sore. The pain does not radiate. The pain is mild. The symptoms are aggravated by position. Pertinent negatives include no bladder incontinence, bowel incontinence, dysuria, fever, headaches, numbness or weakness. Treatments tried: none.        Objective Findings: Musculoskeletal: pt exhibits no edema.           Thoracic spine - there is bilat muscular tenderness, but no spasms       Lumbar spine: pt exhibits no TTP.   Full AROM. Pt exhibits no bony tenderness, no swelling, no edema, no deformity  .    Pre-Existing Condition(s):     Assessment:   Initial Visit    Status: Additional Care Required  Permanent Disability:No    Plan: Medication    Diagnostics:      Comments:       Disability Information   Status: Released to Restricted Duty    From:  4/19/2019  Through: 4/26/2019 Restrictions are:     Physical Restrictions   Sitting:    Standing:    Stooping:    Bending:      Squatting:    Walking:    Climbing:    Pushing:      Pulling:    Other:    Reaching Above Shoulder (L): 0 hrs/day Reaching Above Shoulder  (R): 0 hrs/day     Reaching Below Shoulder (L):    Reaching Below Shoulder (R):      Not to exceed Weight Limits   Carrying(hrs):   Weight Limit(lb): < or = to 10 pounds Lifting(hrs):   Weight  Limit(lb): < or = to 10 pounds   Comments: Thoracic myofascial strain, initial encounter     Otc motrin, prn  Restrictions per D39  F/u 3-5 d      Repetitive Actions   Hands: i.e. Fine Manipulations from Grasping:     Feet: i.e. Operating Foot Controls:     Driving / Operate Machinery:     Physician Name: Deangelo Clay M.D. Physician Signature: DEANGELO Garcia M.D. e-Signature: Dr. Timoteo Gutierrez, Medical Director   Clinic Name / Location: [address] [address] Clinic Phone Number: Dept: [phone]   Appointment Time: 2:55 Pm Visit Start Time: 3:14 PM   Check-In Time:  2:58 Pm Visit Discharge Time:  330PM   Original-Treating Physician or Chiropractor    Page 2-Insurer/TPA    Page 3-Employer    Page 4-Employee

## 2019-04-26 ENCOUNTER — OCCUPATIONAL MEDICINE (OUTPATIENT)
Dept: URGENT CARE | Facility: PHYSICIAN GROUP | Age: 39
End: 2019-04-26
Payer: COMMERCIAL

## 2019-04-26 VITALS
SYSTOLIC BLOOD PRESSURE: 100 MMHG | WEIGHT: 160 LBS | TEMPERATURE: 97.9 F | OXYGEN SATURATION: 96 % | RESPIRATION RATE: 16 BRPM | DIASTOLIC BLOOD PRESSURE: 78 MMHG | HEART RATE: 69 BPM | BODY MASS INDEX: 27.46 KG/M2

## 2019-04-26 DIAGNOSIS — S29.019A THORACIC MYOFASCIAL STRAIN, INITIAL ENCOUNTER: ICD-10-CM

## 2019-04-26 PROCEDURE — 99213 OFFICE O/P EST LOW 20 MIN: CPT | Performed by: FAMILY MEDICINE

## 2019-04-26 NOTE — PROGRESS NOTES
Subjective:   Phuc Duvall is a 38 y.o. male who presents for Back Pain (WC f/v lower mid back injury)    DOI: 4/17 States that he noticed some mid back pain after lifting heavy boxes at work.   Follow-up 4/26/2018: Patient states that pain, stiffness, and range of motion have significantly improved.  Patient states that he is greater than 50% improved at this time though still has mild decrease in range of motion and pain at extreme ranges of motion in his thoracic spine and paraspinal musculature.  Patient denies numbness, tingling, weakness in all 4 extremities.HPI  ROS   Objective:   /78 (BP Location: Right arm, Patient Position: Sitting, BP Cuff Size: Adult)   Pulse 69   Temp 36.6 °C (97.9 °F) (Temporal)   Resp 16   Wt 72.6 kg (160 lb)   SpO2 96%   BMI 27.46 kg/m²   Physical Exam   Constitutional: He is oriented to person, place, and time. He appears well-developed and well-nourished. No distress.   HENT:   Head: Normocephalic and atraumatic.   Eyes: Pupils are equal, round, and reactive to light. Conjunctivae are normal.   Cardiovascular: Normal rate and regular rhythm.    Pulmonary/Chest: Effort normal and breath sounds normal.   Neurological: He is alert and oriented to person, place, and time.   Skin: Skin is warm and dry.   Psychiatric: He has a normal mood and affect. Thought content normal.   Vitals reviewed.    Mild decreased range of motion thoracic spine.  Mild tenderness to palpation thoracic paraspinal musculature with mild spasm in same areas.  Neurovascularly intact all 4 extremities.  DTRs intact throughout.  Assessment/Plan:   1. Thoracic myofascial strain, initial encounter  Use over-the-counter pain reliever, such as acetaminophen (Tylenol), ibuprofen (Advil, Motrin) or naproxen (Aleve) as needed; follow package directions for dosing.   Differential diagnosis, natural history, supportive care, and indications for immediate follow-up discussed.

## 2019-04-26 NOTE — LETTER
[facility] [place] [facility] [address] [address]  Phone:  [phone] - Fax:  [phone]   [facility] Progress Report and Disability Certification  Date of Service: 4/26/2019   No Show:  No  Date / Time of Next Visit: 5/3/2019   Claim Information   Patient Name: Phuc Duvall  Claim Number:     Employer: [employer]  Date of Injury: 4/17/2019     Insurer / TPA: Jett  ID / SSN:     Occupation:   Diagnosis: The encounter diagnosis was Thoracic myofascial strain, initial encounter.    Medical Information   Related to Industrial Injury? Yes    Subjective Complaints:  DOI: 4/17 States that he noticed some mid back pain after lifting heavy boxes at work.   Follow-up 4/26/2018: Patient states that pain, stiffness, and range of motion have significantly improved.  Patient states that he is greater than 50% improved at this time though still has mild decrease in range of motion and pain at extreme ranges of motion in his thoracic spine and paraspinal musculature.  Patient denies numbness, tingling, weakness in all 4 extremities.   Objective Findings: Mild decreased range of motion thoracic spine.  Mild tenderness to palpation thoracic paraspinal musculature with mild spasm in same areas.  Neurovascularly intact all 4 extremities.  DTRs intact throughout.   Pre-Existing Condition(s):     Assessment:   Condition Improved    Status: Additional Care Required  Permanent Disability:No    Plan:      Diagnostics:      Comments:       Disability Information   Status: Released to Restricted Duty    From:  4/26/2019  Through: 5/3/2019 Restrictions are: Temporary   Physical Restrictions   Sitting:    Standing:    Stooping:  < or = to 4 hrs/day Bending:      Squatting:    Walking:    Climbing:    Pushing:      Pulling:  < or = to 6 hrs/day Other:    Reaching Above Shoulder (L): < or = to 6 hrs/day Reaching Above Shoulder (R): < or = 6 hrs/day     Reaching  Below Shoulder (L):    Reaching Below Shoulder (R):      Not to exceed Weight Limits   Carrying(hrs):   Weight Limit(lb): < or = to 25 pounds Lifting(hrs):   Weight  Limit(lb): < or = to 25 pounds   Comments:      Repetitive Actions   Hands: i.e. Fine Manipulations from Grasping:     Feet: i.e. Operating Foot Controls:     Driving / Operate Machinery:     Physician Name: Jose Luis Durand M.D. Physician Signature: JOSE LUIS Philip M.D. e-Signature: Dr. Timoteo Gutierrez, Medical Director   Clinic Name / Location: [address] [address] Clinic Phone Number: Dept: [phone]   Appointment Time: 9:40 Am Visit Start Time: 9:56 AM   Check-In Time:  9:42 Am Visit Discharge Time:  10:18 PM   Original-Treating Physician or Chiropractor    Page 2-Insurer/TPA    Page 3-Employer    Page 4-Employee

## 2019-05-03 ENCOUNTER — OCCUPATIONAL MEDICINE (OUTPATIENT)
Dept: URGENT CARE | Facility: PHYSICIAN GROUP | Age: 39
End: 2019-05-03
Payer: COMMERCIAL

## 2019-05-03 VITALS
HEART RATE: 72 BPM | OXYGEN SATURATION: 95 % | WEIGHT: 158.6 LBS | HEIGHT: 64 IN | DIASTOLIC BLOOD PRESSURE: 78 MMHG | TEMPERATURE: 97.8 F | BODY MASS INDEX: 27.08 KG/M2 | RESPIRATION RATE: 16 BRPM | SYSTOLIC BLOOD PRESSURE: 118 MMHG

## 2019-05-03 DIAGNOSIS — S29.019D ACUTE THORACIC MYOFASCIAL STRAIN, SUBSEQUENT ENCOUNTER: ICD-10-CM

## 2019-05-03 PROCEDURE — 99213 OFFICE O/P EST LOW 20 MIN: CPT | Performed by: FAMILY MEDICINE

## 2019-05-03 RX ORDER — IBUPROFEN 200 MG
200 TABLET ORAL EVERY 6 HOURS PRN
COMMUNITY
End: 2020-09-14

## 2019-05-03 NOTE — PROGRESS NOTES
"Subjective:   Phuc Duvall is a 38 y.o. male who presents for Work-Related Injury (Thoracic myofascial strain;Follow up )    DOI: 4/17 States that he noticed some mid back pain after lifting heavy boxes at work.  F/U 5/3/2019: Has noticed moderate improvement in pain and ROM. No new symptoms . Approximately 70% of normal at this point.HPI  ROS   Objective:   /78   Pulse 72   Temp 36.6 °C (97.8 °F)   Resp 16   Ht 1.626 m (5' 4\")   Wt 71.9 kg (158 lb 9.6 oz)   SpO2 95%   BMI 27.22 kg/m²   Physical Exam   Constitutional: He is oriented to person, place, and time. He appears well-developed and well-nourished. No distress.   HENT:   Head: Normocephalic and atraumatic.   Eyes: Pupils are equal, round, and reactive to light. Conjunctivae are normal.   Cardiovascular: Normal rate and regular rhythm.    Pulmonary/Chest: Effort normal and breath sounds normal.   Neurological: He is alert and oriented to person, place, and time.   Skin: Skin is warm and dry.   Psychiatric: He has a normal mood and affect. Thought content normal.   Vitals reviewed.    TTP mid-thoracic paraspinal musculature.  Assessment/Plan:   1. Acute thoracic myofascial strain, subsequent encounter    Other orders  - ibuprofen (ADVIL) 200 MG Tab; Take 200 mg by mouth every 6 hours as needed.  Use over-the-counter pain reliever, such as acetaminophen (Tylenol), ibuprofen (Advil, Motrin) or naproxen (Aleve) as needed; follow package directions for dosing.   Differential diagnosis, natural history, supportive care, and indications for immediate follow-up discussed.    "

## 2019-05-03 NOTE — LETTER
[facility] [address] [address]  Phone:  [phone] - Fax:  [phone]   [facility] Progress Report and Disability Certification  Date of Service: 5/3/2019   No Show:  No  Date / Time of Next Visit: 5/10/2019   Claim Information   Patient Name: Phuc Duvall  Claim Number:     Employer:  [employer] Date of Injury: 4/17/2019     Insurer / TPA: [employer]  ID / SSN:     Occupation:   Diagnosis: The encounter diagnosis was Acute thoracic myofascial strain, subsequent encounter.    Medical Information   Related to Industrial Injury? Yes    Subjective Complaints:  DOI: 4/17 States that he noticed some mid back pain after lifting heavy boxes at work.  F/U 5/3/2019: Has noticed moderate improvement in pain and ROM. No new symptoms . Approximately 70% of normal at this point.   Objective Findings: TTP mid-thoracic paraspinal musculature.   Pre-Existing Condition(s):     Assessment:   Condition Improved    Status: Additional Care Required  Permanent Disability:No    Plan:      Diagnostics:      Comments:       Disability Information   Status: Released to Restricted Duty    From:  5/3/2019  Through: 5/10/2019 Restrictions are: Temporary   Physical Restrictions   Sitting:    Standing:    Stooping:    Bending:      Squatting:    Walking:    Climbing:    Pushing:      Pulling:    Other:    Reaching Above Shoulder (L):   Reaching Above Shoulder (R):       Reaching Below Shoulder (L):    Reaching Below Shoulder (R):      Not to exceed Weight Limits   Carrying(hrs):   Weight Limit(lb): < or = to 25 pounds Lifting(hrs):   Weight  Limit(lb): < or = to 25 pounds   Comments:      Repetitive Actions   Hands: i.e. Fine Manipulations from Grasping:     Feet: i.e. Operating Foot Controls:     Driving / Operate Machinery:     Physician Name: Paulo Durand M.D. Physician Signature:   e-Signature: Dr. Timoteo Gutierrez, Medical Director   Clinic Name /  Location: [address] [address] Clinic Phone Number: Dept: [phone]   Appointment Time: 9:00 Am Visit Start Time: 9:11 AM   Check-In Time:  9:07 Am Visit Discharge Time:  10:20 AM   Original-Treating Physician or Chiropractor    Page 2-Insurer/TPA    Page 3-Employer    Page 4-Employee

## 2019-05-10 ENCOUNTER — OCCUPATIONAL MEDICINE (OUTPATIENT)
Dept: URGENT CARE | Facility: PHYSICIAN GROUP | Age: 39
End: 2019-05-10
Payer: COMMERCIAL

## 2019-05-10 VITALS
RESPIRATION RATE: 16 BRPM | BODY MASS INDEX: 27.46 KG/M2 | TEMPERATURE: 97.2 F | OXYGEN SATURATION: 96 % | SYSTOLIC BLOOD PRESSURE: 118 MMHG | DIASTOLIC BLOOD PRESSURE: 78 MMHG | HEART RATE: 66 BPM | WEIGHT: 160 LBS

## 2019-05-10 DIAGNOSIS — S29.019D ACUTE THORACIC MYOFASCIAL STRAIN, SUBSEQUENT ENCOUNTER: ICD-10-CM

## 2019-05-10 PROCEDURE — 99213 OFFICE O/P EST LOW 20 MIN: CPT | Performed by: FAMILY MEDICINE

## 2019-05-10 NOTE — LETTER
[facility] [address] [address]  Phone:  [phone] - Fax:  [phone]   [facility] Progress Report and Disability Certification  Date of Service: 5/10/2019   No Show:  No  Date / Time of Next Visit: 5/17/2019   Claim Information   Patient Name: Phuc Duvall  Claim Number:     Employer: [employer]  Date of Injury: 4/17/2019     Insurer / TPA: [employer]  ID / SSN:     Occupation:   Diagnosis: The encounter diagnosis was Acute thoracic myofascial strain, subsequent encounter.    Medical Information   Related to Industrial Injury? Yes    Subjective Complaints:  DOI: 4/17 States that he noticed some mid back pain after lifting heavy boxes at work.  F/U 5/10/2019: Reports significant improvement in pain and stiffness. Still some stiffness in extremes of Thoracic ROM.  No new symptoms.   Objective Findings: Full range of motion thoracic spine.  No tenderness palpation throughout thoracic spinal area.  DTRs intact in all 4 extremities.  Neurovascularly intact all 4 extremities.     Pre-Existing Condition(s):     Assessment:   Condition Improved    Status: Additional Care Required  Permanent Disability:No    Plan:      Diagnostics:      Comments:       Disability Information   Status: Released to Full Duty    From:  5/10/2019  Through: 5/17/2019 Restrictions are: Temporary   Physical Restrictions   Sitting:    Standing:    Stooping:    Bending:      Squatting:    Walking:    Climbing:    Pushing:      Pulling:    Other:    Reaching Above Shoulder (L):   Reaching Above Shoulder (R):       Reaching Below Shoulder (L):    Reaching Below Shoulder (R):      Not to exceed Weight Limits   Carrying(hrs):   Weight Limit(lb):   Lifting(hrs):   Weight  Limit(lb):     Comments:      Repetitive Actions   Hands: i.e. Fine Manipulations from Grasping:     Feet: i.e. Operating Foot Controls:     Driving / Operate Machinery:     Physician Name:  Jose Luis Durand M.D. Physician Signature: JOSE LUIS Philip M.D. e-Signature: Dr. Timoteo Gutierrez, Medical Director   Clinic Name / Location: [address] [address] Clinic Phone Number: Dept: [phone]   Appointment Time: 9:00 Am Visit Start Time: 9:09 AM   Check-In Time:  8:58 Am Visit Discharge Time:  9:30AM   Original-Treating Physician or Chiropractor    Page 2-Insurer/TPA    Page 3-Employer    Page 4-Employee

## 2019-05-10 NOTE — LETTER
[facility] [address] [address]  Phone:  [phone] - Fax:  [phone]   [facility] Progress Report and Disability Certification  Date of Service: 5/10/2019   No Show:  No  Date / Time of Next Visit: 5/17/2019   Claim Information   Patient Name: Phuc Duvall  Claim Number:     Employer: [employer]  Date of Injury: 4/17/2019     Insurer / TPA: [employer]  ID / SSN:     Occupation:   Diagnosis: The encounter diagnosis was Acute thoracic myofascial strain, subsequent encounter.    Medical Information   Related to Industrial Injury? Yes    Subjective Complaints:  DOI: 4/17 States that he noticed some mid back pain after lifting heavy boxes at work.  F/U 5/10/2019: Reports significant improvement in pain and stiffness. Still some stiffness in extremes of Thoracic ROM.  No new symptoms.   Objective Findings: Full range of motion thoracic spine.  No tenderness palpation throughout thoracic spinal area.  DTRs intact in all 4 extremities.  Neurovascularly intact all 4 extremities.     Pre-Existing Condition(s):     Assessment:   Condition Improved    Status: Additional Care Required  Permanent Disability:No    Plan:      Diagnostics:      Comments:       Disability Information   Status: Released to Full Duty    From:  5/10/2019  Through: 5/17/2019 Restrictions are: Temporary   Physical Restrictions   Sitting:    Standing:    Stooping:    Bending:      Squatting:    Walking:    Climbing:    Pushing:      Pulling:    Other:    Reaching Above Shoulder (L):   Reaching Above Shoulder (R):       Reaching Below Shoulder (L):    Reaching Below Shoulder (R):      Not to exceed Weight Limits   Carrying(hrs):   Weight Limit(lb):   Lifting(hrs):   Weight  Limit(lb):     Comments:      Repetitive Actions   Hands: i.e. Fine Manipulations from Grasping:     Feet: i.e. Operating Foot Controls:     Driving / Operate Machinery:     Physician Name:  Jose Luis Durand M.D. Physician Signature: JOSE LUIS Philip M.D. e-Signature: Dr. Timoteo Gutierrez, Medical Director   Clinic Name / Location: [address] [address] Clinic Phone Number: Dept: [phone]   Appointment Time: 9:00 Am Visit Start Time: 9:09 AM   Check-In Time:  8:58 Am Visit Discharge Time:  9:35AM   Original-Treating Physician or Chiropractor    Page 2-Insurer/TPA    Page 3-Employer    Page 4-Employee

## 2019-05-10 NOTE — PROGRESS NOTES
Subjective:   Phuc Duvall is a 38 y.o. male who presents for Back Pain (WC f/v back injury, feeling better)    DOI: 4/17 States that he noticed some mid back pain after lifting heavy boxes at work.  F/U 5/10/2019: Reports significant improvement in pain and stiffness. Still some stiffness in extremes of Thoracic ROM.  No new symptoms.HPI  ROS   Objective:   /78 (BP Location: Right arm, Patient Position: Sitting, BP Cuff Size: Adult)   Pulse 66   Temp 36.2 °C (97.2 °F) (Temporal)   Resp 16   Wt 72.6 kg (160 lb)   SpO2 96%   BMI 27.46 kg/m²   Physical Exam   Constitutional: He is oriented to person, place, and time. He appears well-developed and well-nourished. No distress.   HENT:   Head: Normocephalic and atraumatic.   Eyes: Pupils are equal, round, and reactive to light. Conjunctivae are normal.   Cardiovascular: Normal rate and regular rhythm.    Pulmonary/Chest: Effort normal and breath sounds normal.   Neurological: He is alert and oriented to person, place, and time.   Skin: Skin is warm and dry.   Psychiatric: He has a normal mood and affect. Thought content normal.   Vitals reviewed.    Full range of motion thoracic spine.  No tenderness palpation throughout thoracic spinal area.  DTRs intact in all 4 extremities.  Neurovascularly intact all 4 extremities.    Assessment/Plan:   1. Acute thoracic myofascial strain, subsequent encounter  Use over-the-counter pain reliever, such as acetaminophen (Tylenol), ibuprofen (Advil, Motrin) or naproxen (Aleve) as needed; follow package directions for dosing.   Differential diagnosis, natural history, supportive care, and indications for immediate follow-up discussed.

## 2019-05-17 ENCOUNTER — OCCUPATIONAL MEDICINE (OUTPATIENT)
Dept: URGENT CARE | Facility: PHYSICIAN GROUP | Age: 39
End: 2019-05-17
Payer: COMMERCIAL

## 2019-05-17 VITALS
HEIGHT: 64 IN | DIASTOLIC BLOOD PRESSURE: 76 MMHG | TEMPERATURE: 97.7 F | RESPIRATION RATE: 16 BRPM | BODY MASS INDEX: 26.98 KG/M2 | OXYGEN SATURATION: 95 % | SYSTOLIC BLOOD PRESSURE: 118 MMHG | WEIGHT: 158 LBS | HEART RATE: 62 BPM

## 2019-05-17 DIAGNOSIS — S29.019D ACUTE THORACIC MYOFASCIAL STRAIN, SUBSEQUENT ENCOUNTER: ICD-10-CM

## 2019-05-17 PROCEDURE — 99213 OFFICE O/P EST LOW 20 MIN: CPT | Mod: 29 | Performed by: PHYSICIAN ASSISTANT

## 2019-05-17 ASSESSMENT — ENCOUNTER SYMPTOMS
SENSORY CHANGE: 0
BACK PAIN: 0
TINGLING: 0

## 2019-05-17 NOTE — PROGRESS NOTES
Subjective:   Phuc Duvall is a 38 y.o. male who presents for Follow-Up (WC FV, Mid back pain)    DOI 4/17/19. Patient presents today with improving symptoms. He states that he has been tolerating full duty well. He states that he felt some tightness/stiffness in his mid upper back yesterday at work. He took ibuprofen with some relief. He denies any pain, numbness/tingling.     HPI  Review of Systems   Musculoskeletal: Negative for back pain.        Positive for midback tightness/stiffness   Neurological: Negative for tingling and sensory change.       PMH:  has a past medical history of Asthma and GERD (gastroesophageal reflux disease). He also has no past medical history of Arrhythmia; Blood transfusion without reported diagnosis; Cancer ([facility]); CHF (congestive heart failure) ([facility]); Clotting disorder ([facility]); COPD (chronic obstructive pulmonary disease) ([facility]); Diabetic neuropathy ([facility]); Goiter; Heart attack ([facility]); Heart murmur; Hyperlipidemia; Hypertension; Kidney disease; Pulmonary emphysema ([facility]); Seizure ([facility]); Stroke ([facility]); Thyroid disease; Tuberculosis; or Ulcer.  MEDS:   Current Outpatient Prescriptions:   •  ibuprofen (ADVIL) 200 MG Tab, Take 200 mg by mouth every 6 hours as needed., Disp: , Rfl:   •  omeprazole (PRILOSEC) 20 MG delayed-release capsule, TAKE 1 CAP BY MOUTH EVERY DAY. (Patient not taking: Reported on 4/26/2019), Disp: 30 Cap, Rfl: 8  •  fluticasone (FLONASE) 50 MCG/ACT nasal spray, Spray 1 Spray in nose 2 times a day., Disp: 16 g, Rfl: 0  •  Omega-3 Fatty Acids (FISH OIL) 1000 MG Cap capsule, Take 1,000 mg by mouth 3 times a day, with meals., Disp: , Rfl:   •  omeprazole (PRILOSEC) 40 MG delayed-release capsule, Take 1 Cap by mouth every day., Disp: 30 Cap, Rfl: 11  ALLERGIES: No Known Allergies  SURGHX: History reviewed. No pertinent surgical history.  SOCHX:  reports that he has never smoked. He has never used smokeless tobacco. He reports that he drinks about 3.6 oz of alcohol per week . He  "reports that he uses drugs, including Marijuana.  FH: Reviewed with patient, not pertinent to this visit.     Objective:   /76 (BP Location: Left arm, Patient Position: Sitting, BP Cuff Size: Adult)   Pulse 62   Temp 36.5 °C (97.7 °F) (Temporal)   Resp 16   Ht 1.626 m (5' 4\")   Wt 71.7 kg (158 lb)   SpO2 95%   BMI 27.12 kg/m²   Physical Exam   Constitutional: He is oriented to person, place, and time. He appears well-developed and well-nourished. No distress.   HENT:   Head: Normocephalic and atraumatic.   Nose: Nose normal.   Eyes: Conjunctivae and EOM are normal.   Neck: Normal range of motion. No tracheal deviation present.   Pulmonary/Chest: Effort normal. No respiratory distress.   Musculoskeletal:        Thoracic back: He exhibits normal range of motion, no tenderness, no bony tenderness, no swelling, no edema, no deformity, no laceration, no spasm and normal pulse.        Back:    ROM normal all four extremities. Patient localizes area of tightness/stiffness to upper midback. No tenderness.    Neurological: He is alert and oriented to person, place, and time.   Skin: Skin is warm and dry.   Psychiatric: He has a normal mood and affect. His behavior is normal. Judgment and thought content normal.   Vitals reviewed.      Assessment/Plan:   1. Acute thoracic myofascial strain, subsequent encounter    - Advised to continue OTC ibuprofen/acetaminophen prn  - Advised to try applying heat, gentle stretching after work  - Continue full duty  - Follow up 1 week for likely MMI    Differential diagnosis, natural history, supportive care, and indications for immediate follow-up discussed.  "

## 2019-05-17 NOTE — LETTER
[facility] [address] [address]  Phone:  [phone] - Fax:  [phone]   [facility] Progress Report and Disability Certification  Date of Service: 5/17/2019   No Show:  No  Date / Time of Next Visit: 5/24/2019   Claim Information   Patient Name: Phuc Duvall  Claim Number:     Employer:   [employer]  Date of Injury: 4/17/2019     Insurer / TPA: [employer]  ID / SSN:     Occupation:   Diagnosis: The encounter diagnosis was Acute thoracic myofascial strain, subsequent encounter.    Medical Information   Related to Industrial Injury? Yes    Subjective Complaints:  DOI 4/17/19. Patient presents today with improving symptoms. He states that he has been tolerating full duty well. He states that he felt some tightness/stiffness in his mid upper back yesterday at work. He took ibuprofen with some relief. He denies any pain, numbness/tingling.   Objective Findings: Constitutional: He is oriented to person, place, and time. He appears well-developed and well-nourished. No distress.   HENT:   Head: Normocephalic and atraumatic.   Nose: Nose normal.   Eyes: Conjunctivae and EOM are normal.   Neck: Normal range of motion. No tracheal deviation present.   Pulmonary/Chest: Effort normal. No respiratory distress.   Musculoskeletal:        Thoracic back: He exhibits normal range of motion, no tenderness, no bony tenderness, no swelling, no edema, no deformity, no laceration, no spasm and normal pulse.   ROM normal all four extremities. Patient localizes area of tightness/stiffness to upper midback. No tenderness.    Neurological: He is alert and oriented to person, place, and time.   Skin: Skin is warm and dry.   Psychiatric: He has a normal mood and affect. His behavior is normal. Judgment and thought content normal.   Vitals reviewed.   Pre-Existing Condition(s):     Assessment:   Condition Improved    Status: Additional Care Required   Permanent Disability:No    Plan:   Comments:OTC ibuprofen/acetaminophen as needed, apply heat, gentle stretching    Diagnostics:      Comments:       Disability Information   Status: Released to Full Duty    From:  5/17/2019  Through: 5/24/2019 Restrictions are: Temporary   Physical Restrictions   Sitting:    Standing:    Stooping:    Bending:      Squatting:    Walking:    Climbing:    Pushing:      Pulling:    Other:    Reaching Above Shoulder (L):   Reaching Above Shoulder (R):       Reaching Below Shoulder (L):    Reaching Below Shoulder (R):      Not to exceed Weight Limits   Carrying(hrs):   Weight Limit(lb):   Lifting(hrs):   Weight  Limit(lb):     Comments: Follow up 1 week    Repetitive Actions   Hands: i.e. Fine Manipulations from Grasping:     Feet: i.e. Operating Foot Controls:     Driving / Operate Machinery:     Physician Name: Philip Lopes P.A.-C. Physician Signature: PHILIP Kwon P.A.-C. e-Signature: Dr. Timoteo Gutierrez, Medical Director   Clinic Name / Location: [facility] [address] [address] Clinic Phone Number: Dept: [phone]   Appointment Time: 9:00 Am Visit Start Time: 9:18 AM   Check-In Time:  9:12 Am Visit Discharge Time:  10:05AM   Original-Treating Physician or Chiropractor    Page 2-Insurer/TPA    Page 3-Employer    Page 4-Employee

## 2019-06-25 ENCOUNTER — OFFICE VISIT (OUTPATIENT)
Dept: URGENT CARE | Facility: PHYSICIAN GROUP | Age: 39
End: 2019-06-25
Payer: COMMERCIAL

## 2019-06-25 VITALS
BODY MASS INDEX: 26.8 KG/M2 | WEIGHT: 157 LBS | HEIGHT: 64 IN | TEMPERATURE: 97.1 F | DIASTOLIC BLOOD PRESSURE: 78 MMHG | OXYGEN SATURATION: 98 % | HEART RATE: 86 BPM | SYSTOLIC BLOOD PRESSURE: 128 MMHG | RESPIRATION RATE: 16 BRPM

## 2019-06-25 DIAGNOSIS — R74.01 ELEVATED ALANINE AMINOTRANSFERASE (ALT) LEVEL: ICD-10-CM

## 2019-06-25 DIAGNOSIS — R53.83 FATIGUE, UNSPECIFIED TYPE: ICD-10-CM

## 2019-06-25 PROCEDURE — 99214 OFFICE O/P EST MOD 30 MIN: CPT | Performed by: FAMILY MEDICINE

## 2019-06-25 ASSESSMENT — ENCOUNTER SYMPTOMS
MYALGIAS: 0
WEIGHT LOSS: 0
EYE DISCHARGE: 0
EYE REDNESS: 0
FOCAL WEAKNESS: 0
SENSORY CHANGE: 0

## 2019-06-25 NOTE — LETTER
June 25, 2019         Patient: Phuc Duvall   YOB: 1980   Date of Visit: 6/25/2019           To Whom it May Concern:    Phuc Duvall was seen in my clinic on 6/25/2019. Please excuse 6/26/2019.    Sincerely,           Artis Min M.D.  Electronically Signed

## 2019-06-26 NOTE — PROGRESS NOTES
"Subjective:      Phuc Duvall is a 38 y.o. male who presents with Breathing Problem (Difficulty breathing, feeling anxious  x3days )            3 days fatigue possibly be associated be hungover from drinking EtOH. He is concerned about his liver. Associated SOB. No wheeze. . Mild cough. No limb swelling. No other aggravating or alleviating factors.          Review of Systems   Constitutional: Negative for weight loss.   Eyes: Negative for discharge and redness.   Musculoskeletal: Negative for joint pain and myalgias.   Skin: Negative for itching and rash.   Neurological: Negative for sensory change and focal weakness.     .  Medications, Allergies, and current problem list reviewed today in Epic       Objective:     /78   Pulse 86   Temp 36.2 °C (97.1 °F) (Temporal)   Resp 16   Ht 1.626 m (5' 4\")   Wt 71.2 kg (157 lb)   SpO2 98%   BMI 26.95 kg/m²      Physical Exam   Constitutional: He is oriented to person, place, and time. He appears well-developed and well-nourished. No distress.   HENT:   Head: Normocephalic and atraumatic.   Right Ear: External ear normal.   Left Ear: External ear normal.   Eyes: Conjunctivae are normal.   Cardiovascular: Normal rate, regular rhythm and normal heart sounds.    Pulmonary/Chest: Effort normal and breath sounds normal.   Neurological: He is alert and oriented to person, place, and time.   Skin: Skin is warm and dry.                Assessment/Plan:     Labs reviewed.  Slight elevation of ALT at 70.    1. Fatigue, unspecified type  CBC WITH DIFFERENTIAL    Comp Metabolic Panel    TSH   2. Elevated alanine aminotransferase (ALT) level       Differential diagnosis, natural history, supportive care, and indications for immediate follow-up discussed at length.     Discussed lab findings with Delmer.  Recommend to avoid alcohol.  Given no pain, fever, and normal bilirubin level I think is reasonable to follow-up with primary care.  "

## 2019-06-28 LAB
ALBUMIN SERPL-MCNC: 4.7 G/DL (ref 3.5–5.5)
ALBUMIN/GLOB SERPL: 1.9 {RATIO} (ref 1.2–2.2)
ALP SERPL-CCNC: 83 IU/L (ref 39–117)
ALT SERPL-CCNC: 70 IU/L (ref 0–44)
AST SERPL-CCNC: 33 IU/L (ref 0–40)
BASOPHILS # BLD AUTO: 0 X10E3/UL (ref 0–0.2)
BASOPHILS NFR BLD AUTO: 0 %
BILIRUB SERPL-MCNC: 0.9 MG/DL (ref 0–1.2)
BUN SERPL-MCNC: 15 MG/DL (ref 6–20)
BUN/CREAT SERPL: 13 (ref 9–20)
CALCIUM SERPL-MCNC: 9.8 MG/DL (ref 8.7–10.2)
CHLORIDE SERPL-SCNC: 105 MMOL/L (ref 96–106)
CO2 SERPL-SCNC: 20 MMOL/L (ref 20–29)
CREAT SERPL-MCNC: 1.18 MG/DL (ref 0.76–1.27)
EOSINOPHIL # BLD AUTO: 0.1 X10E3/UL (ref 0–0.4)
EOSINOPHIL NFR BLD AUTO: 2 %
ERYTHROCYTE [DISTWIDTH] IN BLOOD BY AUTOMATED COUNT: 14 % (ref 12.3–15.4)
GLOBULIN SER CALC-MCNC: 2.5 G/DL (ref 1.5–4.5)
GLUCOSE SERPL-MCNC: 88 MG/DL (ref 65–99)
HCT VFR BLD AUTO: 48.2 % (ref 37.5–51)
HGB BLD-MCNC: 15.7 G/DL (ref 13–17.7)
IMM GRANULOCYTES # BLD AUTO: 0 X10E3/UL (ref 0–0.1)
IMM GRANULOCYTES NFR BLD AUTO: 0 %
IMMATURE CELLS  115398: NORMAL
LYMPHOCYTES # BLD AUTO: 2.3 X10E3/UL (ref 0.7–3.1)
LYMPHOCYTES NFR BLD AUTO: 42 %
MCH RBC QN AUTO: 28.1 PG (ref 26.6–33)
MCHC RBC AUTO-ENTMCNC: 32.6 G/DL (ref 31.5–35.7)
MCV RBC AUTO: 86 FL (ref 79–97)
MONOCYTES # BLD AUTO: 0.5 X10E3/UL (ref 0.1–0.9)
MONOCYTES NFR BLD AUTO: 8 %
MORPHOLOGY BLD-IMP: NORMAL
NEUTROPHILS # BLD AUTO: 2.6 X10E3/UL (ref 1.4–7)
NEUTROPHILS NFR BLD AUTO: 48 %
NRBC BLD AUTO-RTO: NORMAL %
PLATELET # BLD AUTO: 305 X10E3/UL (ref 150–450)
POTASSIUM SERPL-SCNC: 4.4 MMOL/L (ref 3.5–5.2)
PROT SERPL-MCNC: 7.2 G/DL (ref 6–8.5)
RBC # BLD AUTO: 5.58 X10E6/UL (ref 4.14–5.8)
SODIUM SERPL-SCNC: 140 MMOL/L (ref 134–144)
TSH SERPL DL<=0.005 MIU/L-ACNC: 1.44 UIU/ML (ref 0.45–4.5)
WBC # BLD AUTO: 5.5 X10E3/UL (ref 3.4–10.8)

## 2019-07-09 DIAGNOSIS — K29.00 ACUTE GASTRITIS WITHOUT HEMORRHAGE, UNSPECIFIED GASTRITIS TYPE: ICD-10-CM

## 2019-07-09 RX ORDER — OMEPRAZOLE 20 MG/1
20 CAPSULE, DELAYED RELEASE ORAL DAILY
Qty: 90 CAP | Refills: 3 | Status: SHIPPED | OUTPATIENT
Start: 2019-07-09 | End: 2020-09-14

## 2020-09-14 ENCOUNTER — OCCUPATIONAL MEDICINE (OUTPATIENT)
Dept: URGENT CARE | Facility: PHYSICIAN GROUP | Age: 40
End: 2020-09-14
Payer: COMMERCIAL

## 2020-09-14 VITALS
WEIGHT: 160 LBS | HEIGHT: 64 IN | SYSTOLIC BLOOD PRESSURE: 120 MMHG | HEART RATE: 66 BPM | DIASTOLIC BLOOD PRESSURE: 80 MMHG | TEMPERATURE: 98.2 F | RESPIRATION RATE: 14 BRPM | OXYGEN SATURATION: 98 % | BODY MASS INDEX: 27.31 KG/M2

## 2020-09-14 DIAGNOSIS — M54.6 ACUTE THORACIC BACK PAIN, UNSPECIFIED BACK PAIN LATERALITY: ICD-10-CM

## 2020-09-14 PROCEDURE — 99214 OFFICE O/P EST MOD 30 MIN: CPT | Performed by: PHYSICIAN ASSISTANT

## 2020-09-14 RX ORDER — PREDNISONE 20 MG/1
20 TABLET ORAL DAILY
Qty: 5 TAB | Refills: 0 | Status: SHIPPED | OUTPATIENT
Start: 2020-09-14 | End: 2020-09-19

## 2020-09-14 ASSESSMENT — FIBROSIS 4 INDEX: FIB4 SCORE: 0.5

## 2020-09-14 NOTE — LETTER
"EMPLOYEE’S CLAIM FOR COMPENSATION/ REPORT OF INITIAL TREATMENT  FORM C-4    EMPLOYEE’S CLAIM - PROVIDE ALL INFORMATION REQUESTED   First Name  Phuc Last Name  Jadiel Birthdate                    1980                Sex  Male Claim Number   Home Address  [address] Age  40 y.o. Height  1.626 m (5' 4\") Weight  72.6 kg (160 lb) [facility]     [facility] Zip  [address] Telephone  [phone] (home)    Mailing Address  [address] [facility] Zip  [address] Primary Language Spoken  English    Insurer   Third Party   [employer]ett   Employee's Occupation (Job Title) When Injury or Occupational Disease Occurred      Employer's Name  [employer] Telephone  [phone]    Employer Address  [address] [place] [address]  [id number]    Date of Injury  9/9/2020               Hour of Injury  11:00 AM Date Employer Notified  9/9/2020 Last Day of Work after Injury     or Occupational Disease  9/14/2020 Supervisor to Whom Injury     Reported  VINH MONTALVO   Address or Location of Accident (if applicable)  [place] [address]   What were you doing at the time of accident? (if applicable)   BOXES    How did this injury or occupational disease occur? (Be specific an answer in detail. Use additional sheet if necessary)  REACH OUT FOR BOX, OUT OF REACH , AND WENT TO GRAB A COUPLE OF BOXES, START TO FELT DISCOMFORT ON THE MIDDLE OF THE BACK. FOR THE REST OF THE DAY.    If you believe that you have an occupational disease, when did you first have knowledge of the disability and it relationship to your employment?  N/A Witnesses to the Accident  N/A      Nature of Injury or Occupational Disease  Strain  Part(s) of Body Injured or Affected  Lower Back Area (Lumbar Area & Lumbo-Sacral), ,     I certify that the above is true and correct to the best of my knowledge and that I have provided this " information in order to obtain the benefits of [place]  I hereby authorize any physician, chiropractor, surgeon, practitioner, or other person, any hospital, including [facility], any medical service organization, any insurance company, or other institution or organization to release to each other, any medical or other information, including benefits paid or payable, pertinent to this injury or disease, except information relative to diagnosis, treatment and/or counseling for AIDS, psychological conditions, alcohol or controlled substances, for which I must give specific authorization.  A Photostat of this authorization shall be as valid as the original.     Date   Place   Employee’s Signature   THIS REPORT MUST BE COMPLETED AND MAILED WITHIN 3 WORKING DAYS OF TREATMENT   Place  [facility]  Name of Facility  [facility]   Date  9/14/2020 Diagnosis  (M54.6) Acute thoracic back pain, unspecified back pain laterality Is there evidence the injured employee was under the              influence of alcohol and/or another controlled substance at the time of accident?   Hour  5:46 PM Description of Injury or Disease  The encounter diagnosis was Acute thoracic back pain, unspecified back pain laterality. No   Treatment  PT to take otc pain reliever as discussed.  Rx prednisone x 5 days, ice to painful area as directed.  Gentle stretching as discussed. RTC as scheduled.   Have you advised the patient to remain off work five days or     more? No   X-Ray Findings      If Yes   From Date  To Date      From information given by the employee, together with medical evidence, can you directly connect this injury or occupational disease as job incurred?  Yes If No Full Duty    No Modified Duty  Yes   Is additional medical care by a physician indicated?  Yes If Modified Duty,  "Specify any Limitations / Restrictions  No stooping, lifting/carrying limited to 4 hours per day, limited to 20 pounds or less.    Do you know of any previous injury or disease contributing to this condition or occupational disease?                            Yes  Comments:similar injury last year to thoracic area of back, same job   Date  9/14/2020 Print Doctor’s Name   Chris Bob P.A.-C. I certify the employer’s copy of  this form was mailed on:   Address  [address]  [facility] Insurer’s Use Only     [facility]  [address]    Provider’s Tax ID Number  [id number] Telephone  Dept: [phone]      omer-CHRIS Peterson P.A.-C.  Signature:     Degree          ORIGINAL-TREATING PHYSICIAN OR CHIROPRACTOR    PAGE 2-INSURER/TPA    PAGE 3-EMPLOYER    PAGE 4-EMPLOYEE        Form C-4 (rev.10/07)          BRIEF DESCRIPTION OF RIGHTS AND BENEFITS  (Pursuant to [place] 616C.050)    Notice of Injury or Occupational Disease (Incident Report Form C-1): If an injury or occupational disease (OD) arises out of and in the course of employment, you must provide written notice to your employer as soon as practicable, but no later than 7 days after the accident or OD. Your employer shall maintain a sufficient supply of the required forms.     Claim for Compensation (Form C-4): If medical treatment is sought, the form C-4 is available at the place of initial treatment. A completed \"Claim for Compensation\" (Form C-4) must be filed within 90 days after an accident or OD. The treating physician or chiropractor must, within 3 working days after treatment, complete and mail to the employer, the employer's insurer and third-party , the Claim for Compensation.     Medical Treatment: If you require medical treatment for your on-the-job injury or OD, you may be required to select a physician or chiropractor from a list provided by your workers’ compensation insurer, if it has contracted with an Organization for " Managed Care (MCO) or Preferred Provider Organization (PPO) or providers of health care. If your employer has not entered into a contract with an MCO or PPO, you may select a physician or chiropractor from the Panel of Physicians and Chiropractors. Any medical costs related to your industrial injury or OD will be paid by your insurer.     Temporary Total Disability (TTD): If your doctor has certified that you are unable to work for a period of at least 5 consecutive days, or 5 cumulative days in a 20-day period, or places restrictions on you that your employer does not accommodate, you may be entitled to TTD compensation.     Temporary Partial Disability (TPD): If the wage you receive upon reemployment is less than the compensation for TTD to which you are entitled, the insurer may be required to pay you TPD compensation to make up the difference. TPD can only be paid for a maximum of 24 months.     Permanent Partial Disability (PPD): When your medical condition is stable and there is an indication of a PPD as a result of your injury or OD, within 30 days, your insurer must arrange for an evaluation by a rating physician or chiropractor to determine the degree of your PPD. The amount of your PPD award depends on the date of injury, the results of the PPD evaluation and your age and wage.     Permanent Total Disability (PTD): If you are medically certified by a treating physician or chiropractor as permanently and totally disabled and have been granted a PTD status by your insurer, you are entitled to receive monthly benefits not to exceed 66 2/3% of your average monthly wage. The amount of your PTD payments is subject to reduction if you previously received a PPD award.     Vocational Rehabilitation Services: You may be eligible for vocational rehabilitation services if you are unable to return to the job due to a permanent physical impairment or permanent restrictions as a result of your injury or occupational  disease.     Transportation and Per Willis Reimbursement: You may be eligible for travel expenses and per willis associated with medical treatment.     Reopening: You may be able to reopen your claim if your condition worsens after claim closure.     Appeal Process: If you disagree with a written determination issued by the insurer or the insurer does not respond to your request, you may appeal to the Department of Administration, , by following the instructions contained in your determination letter. You must appeal the determination within 70 days from the date of the determination letter at [address], [address], [place], [place] [address], or [address], [address], [place], [place] [address]. If you disagree with the  decision, you may appeal to the Department of Administration, . You must file your appeal within 30 days from the date of the  decision letter at [address], [address], [place], [place] [address], or [address], [place], [place] [address]. If you disagree with a decision of an , you may file a petition for judicial review with the District Court. You must do so within 30 days of the Appeal Officer’s decision. You may be represented by an  at your own expense or you may contact the [facility] for possible representation.     [place]  for Injured Workers (NAIW): If you disagree with a  decision, you may request that NAIW represent you without charge at an  Hearing. For information regarding denial of benefits, you may contact the [facility] at: [address], [address], [place] [address], [phone], or [address] [facility], [place] [address], [phone]     To File a Complaint with the Division: If you wish to file a complaint with the  of the Division of Industrial Relations (DIR), please contact the  Workers’ Compensation Section, [address], [address], [place], [place] [address], telephone [phone], or [address], [place], [place] [address], telephone [phone].     For assistance with Workers’ Compensation Issues: You may contact the Office of the Governor Consumer Health Assistance, [address] [place] [address], [address], [place], [place] [address], Toll Free 1-927.945.6643, Web site: http://govcha.Critical access [email or web address]., E-mail [email or web address].              __________________________________________________________________                              ___________________         Employee Name / Signature                                                                                                                     Date                                                                                                                                                                                       D-2 (rev. 01/20)

## 2020-09-14 NOTE — LETTER
[facility] [address] [place] [address]  Phone:  [phone] - Fax:  [phone]   [facility] Progress Report and Disability Certification  Date of Service: 9/14/2020   No Show:  No  Date / Time of Next Visit: 9/20/2020   Claim Information   Patient Name: Phuc Duvall  Claim Number:     Employer:   [employer] Date of Injury: 9/9/2020     Insurer / TPA: Jett  ID / SSN:     Occupation:   Diagnosis: The encounter diagnosis was Acute thoracic back pain, unspecified back pain laterality.    Medical Information   Related to Industrial Injury? Yes    Subjective Complaints:  Patient presents with:  Work-Related Injury: mid back pain x1 wk.  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 Pt states he reported this to his supervisor, was given some tylenol and ice with little improvement.  Pt states he has noticed it is better in the morning but gets progressively worse each day despite tylenol and ice.  Pt denies BUE pain, tingling, or low back pain.  pain is worst with rotation and with stooping and reaching at the same time which patient reports he does at work all the time. Pt denies any other complaint, does not have a second job.        Objective Findings: Thoracic back exam: TTP to back T6-T8 area bilaterally along paraspinous muscles.  No ecchymosis, abrasion or rash. FROM with pain while stooping, reaching, resisted upper extremity movements.   No pain to low back or neck.   5/5.    Pre-Existing Condition(s):     Assessment:   Initial Visit    Status: Additional Care Required  Permanent Disability:No    Plan: Medication    Diagnostics:      Comments:       Disability Information   Status: Released to Restricted Duty    From:  9/14/2020  Through: 9/20/2020 Restrictions are: Temporary   Physical Restrictions   Sitting:    Standing:   Stoopin hrs/day Bending:      Squatting:    Walking:    Climbing:    Pushing:      Pulling:    Other:    Reaching Above Shoulder (L):   Reaching Above Shoulder (R):       Reaching Below Shoulder (L):    Reaching Below Shoulder (R):      Not to exceed Weight Limits   Carrying(hrs): 4 Weight Limit(lb):   Comments:20 pounds or less Lifting(hrs): 4 Weight  Limit(lb):   Comments:20 pounds or less   Comments: PT to take otc pain reliever as discussed.  Rx prednisone x 5 days, ice to painful area as directed.  Gentle stretching as discussed. RTC as scheduled.     Repetitive Actions   Hands: i.e. Fine Manipulations from Grasping:     Feet: i.e. Operating Foot Controls:     Driving / Operate Machinery:     Provider Name:   Batsheva Bob P.A.-C. Physician Signature:  Physician Name:     Clinic Name / Location: [address] [address] Clinic Phone Number: Dept: [phone]   Appointment Time: 4:25 Pm Visit Start Time: 5:46 PM   Check-In Time:  4:55 Pm Visit Discharge Time:  6:40 PM   Original-Treating Physician or Chiropractor    Page 2-Insurer/TPA    Page 3-Employer    Page 4-Employee

## 2020-09-15 NOTE — PROGRESS NOTES
"Subjective:      Phuc Duvall is a 40 y.o. male who presents with Work-Related Injury (mid back pain x1 wk)    Pt PMH, SocHx, SurgHx, FamHx, Drug allergies and medications reviewed with pt/EPIC.      Family history reviewed, it is not pertinent to this complaint.       Patient presents with:  Work-Related Injury: mid back pain x1 wk.  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 Pt states he reported this to his supervisor, was given some tylenol and ice with little improvement.  Pt states he has noticed it is better in the morning but gets progressively worse each day despite tylenol and ice.  Pt denies BUE pain, tingling, or low back pain.  pain is worst with rotation and with stooping and reaching at the same time which patient reports he does at work all the time. Pt denies any other complaint, does not have a second job.          HPI    ROS       Objective:     /80 (BP Location: Right arm, Patient Position: Sitting, BP Cuff Size: Small adult)   Pulse 66   Temp 36.8 °C (98.2 °F) (Temporal)   Resp 14   Ht 1.626 m (5' 4\")   Wt 72.6 kg (160 lb)   SpO2 98%   BMI 27.46 kg/m²      Physical Exam    Thoracic back exam: TTP to back T6-T8 area bilaterally along paraspinous muscles.  No ecchymosis, abrasion or rash. FROM with pain while stooping, reaching, resisted upper extremity movements.   No pain to low back or neck.   5/5.        Assessment/Plan:         1. Acute thoracic back pain, unspecified back pain laterality  predniSONE (DELTASONE) 20 MG Tab     Follow D-39 instructions/restrictions. Return to clinic as scheduled.     "

## 2020-09-20 ENCOUNTER — OCCUPATIONAL MEDICINE (OUTPATIENT)
Dept: URGENT CARE | Facility: PHYSICIAN GROUP | Age: 40
End: 2020-09-20
Payer: COMMERCIAL

## 2020-09-20 ENCOUNTER — SUPERVISING PHYSICIAN REVIEW (OUTPATIENT)
Dept: URGENT CARE | Facility: PHYSICIAN GROUP | Age: 40
End: 2020-09-20

## 2020-09-20 VITALS
WEIGHT: 160 LBS | HEIGHT: 64 IN | OXYGEN SATURATION: 96 % | RESPIRATION RATE: 16 BRPM | SYSTOLIC BLOOD PRESSURE: 102 MMHG | DIASTOLIC BLOOD PRESSURE: 66 MMHG | HEART RATE: 59 BPM | TEMPERATURE: 96.8 F | BODY MASS INDEX: 27.31 KG/M2

## 2020-09-20 DIAGNOSIS — S29.019D ACUTE THORACIC MYOFASCIAL STRAIN, SUBSEQUENT ENCOUNTER: ICD-10-CM

## 2020-09-20 PROCEDURE — 99213 OFFICE O/P EST LOW 20 MIN: CPT | Performed by: PHYSICIAN ASSISTANT

## 2020-09-20 ASSESSMENT — ENCOUNTER SYMPTOMS
BLURRED VISION: 0
CHILLS: 0
ABDOMINAL PAIN: 0
SHORTNESS OF BREATH: 0
BACK PAIN: 1
NAUSEA: 0
SENSORY CHANGE: 0
HEADACHES: 0
WEIGHT LOSS: 0
TINGLING: 0
VOMITING: 0
FEVER: 0
PALPITATIONS: 0

## 2020-09-20 ASSESSMENT — FIBROSIS 4 INDEX: FIB4 SCORE: 0.5

## 2020-09-20 NOTE — PROGRESS NOTES
"Subjective:      Phuc Duvall is a 40 y.o. male who presents with Back Injury (w/c follow up)    DOI 9/9/2020:    ORIGINAL HPI COPIED: Work-Related Injury: mid back pain x1 wk.  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 Pt states he reported this to his supervisor, was given some tylenol and ice with little improvement.  Pt states he has noticed it is better in the morning but gets progressively worse each day despite tylenol and ice.  Pt denies BUE pain, tingling, or low back pain.  pain is worst with rotation and with stooping and reaching at the same time which patient reports he does at work all the time. Pt denies any other complaint, does not have a second job.       9/20/2020 Visit #2: Patient completed course of prednisone.  He has been applying some icy hot patches to the area as well.  States that he feels significant improvement but with certain movements he still feels some \"tightness\".  Pain does not radiate into his lower back at all.  No radiation of pain into his upper extremities.  No focal weakness in his upper or lower extremities.  No radicular symptoms.  Patient started ports that pain is worse with rotation.  No new injury.  States that he has not been back to work since initial incident.      Review of Systems   Constitutional: Negative for chills, fever and weight loss.   Eyes: Negative for blurred vision.   Respiratory: Negative for shortness of breath.    Cardiovascular: Negative for chest pain and palpitations.   Gastrointestinal: Negative for abdominal pain, nausea and vomiting.   Musculoskeletal: Positive for back pain.   Neurological: Negative for tingling, sensory change and headaches.       PMH: No pertinent past medical history to this problem  MEDS: Medications were reviewed in Epic  ALLERGIES: Allergies were reviewed in Epic  SOCHX: Works as a  at [employer]   FH: No " "pertinent family history to this problem       Objective:     /66 (BP Location: Left arm, Patient Position: Sitting, BP Cuff Size: Adult)   Pulse (!) 59   Temp 36 °C (96.8 °F) (Temporal)   Resp 16   Ht 1.626 m (5' 4\")   Wt 72.6 kg (160 lb)   SpO2 96%   BMI 27.46 kg/m²      Physical Exam  Constitutional:       Appearance: He is well-developed.   HENT:      Head: Normocephalic and atraumatic.      Right Ear: External ear normal.      Left Ear: External ear normal.   Eyes:      Conjunctiva/sclera: Conjunctivae normal.      Pupils: Pupils are equal, round, and reactive to light.   Cardiovascular:      Rate and Rhythm: Normal rate and regular rhythm.      Heart sounds: Normal heart sounds. No murmur.   Pulmonary:      Effort: Pulmonary effort is normal.      Breath sounds: Normal breath sounds. No wheezing.   Musculoskeletal:      Comments: Thoracic back: Spinous processes are nontender to palpation with no step-offs or obvious deformities noted.  Paraspinous muscles are nontender to palpation without spasm noted.  No swelling, ecchymosis, overlying erythema noted.  Patient has full ROM of thoracic and lumbar spine.  5/5 strength in upper extremities bilaterally.  5/5 strength of lower extremities bilaterally.  Negative straight leg raise test bilaterally.  5/5 strength of upper extremity resisted motions without pain.  Patient is able to stoop and bend and reach above his head with and without resistance without pain.     Skin:     General: Skin is warm and dry.      Capillary Refill: Capillary refill takes less than 2 seconds.   Neurological:      Mental Status: He is alert and oriented to person, place, and time.      Motor: No weakness.      Deep Tendon Reflexes:      Reflex Scores:       Tricep reflexes are 2+ on the right side and 2+ on the left side.       Bicep reflexes are 2+ on the right side and 2+ on the left side.       Brachioradialis reflexes are 2+ on the right side and 2+ on the left side.   "     Patellar reflexes are 2+ on the right side and 2+ on the left side.  Psychiatric:         Behavior: Behavior normal.         Judgment: Judgment normal.           Assessment/Plan:     1. Acute thoracic myofascial strain, subsequent encounter  With restrictions of no lifting heavier than 25 pounds.  We will follow-up in 1 week for reevaluation.  Anticipate either trial of full duty or discharge/MMI at that time.  Patient can continue to take OTC NSAIDs and alternate ice/heat to the affected areas as needed.

## 2020-09-20 NOTE — LETTER
"   [facility] [address] [place] [address]  Phone:  [phone] - Fax:  [phone]   [facility] Progress Report and Disability Certification  Date of Service: 9/20/2020   No Show:  No  Date / Time of Next Visit: 9/26/2020   Claim Information   Patient Name: Phuc Duvall  Claim Number:     Employer:   [employer] Date of Injury: 9/9/2020     Insurer / TPA: [employer]ett  ID / SSN:     Occupation:   Diagnosis: The encounter diagnosis was Acute thoracic myofascial strain, subsequent encounter.    Medical Information   Related to Industrial Injury? Yes    Subjective Complaints:  DOI 9/9/2020:    9/20/2020 Visit #2: Patient completed course of prednisone.  He has been applying some icy hot patches to the area as well.  States that he feels significant improvement but with certain movements he still feels some \"tightness\".  Pain does not radiate into his lower back at all.  No radiation of pain into his upper extremities.  No focal weakness in his upper or lower extremities.  No radicular symptoms.  Patient started ports that pain is worse with rotation.  No new injury.  States that he has not been back to work since initial incident.   Objective Findings:  Musculoskeletal:      Comments: Thoracic back: Spinous processes are nontender to palpation with no step-offs or obvious deformities noted.  Paraspinous muscles are nontender to palpation without spasm noted.  No swelling, ecchymosis, overlying erythema noted.  Patient has full ROM of thoracic and lumbar spine.  5/5 strength in upper extremities bilaterally.  5/5 strength of lower extremities bilaterally.  Negative straight leg raise test bilaterally.  5/5 strength of upper extremity resisted motions without pain.  Patient is able to stoop and bend and reach above his head with and without resistance without pain.  Neurological:      Mental Status: He is alert and oriented to person, place, and " time.      Motor: No weakness.      Deep Tendon Reflexes:      Reflex Scores:       Tricep reflexes are 2+ on the right side and 2+ on the left side.       Bicep reflexes are 2+ on the right side and 2+ on the left side.       Brachioradialis reflexes are 2+ on the right side and 2+ on the left side.       Patellar reflexes are 2+ on the right side and 2+ on the left side.   Pre-Existing Condition(s):     Assessment:   Condition Improved    Status: Additional Care Required  Permanent Disability:No    Plan:      Diagnostics:      Comments:       Disability Information   Status: Released to Restricted Duty    From:  9/20/2020  Through: 9/26/2020 Restrictions are: Temporary   Physical Restrictions   Sitting:    Standing:    Stooping:    Bending:      Squatting:    Walking:    Climbing:    Pushing:      Pulling:    Other:    Reaching Above Shoulder (L):   Reaching Above Shoulder (R):       Reaching Below Shoulder (L):    Reaching Below Shoulder (R):      Not to exceed Weight Limits   Carrying(hrs):   Weight Limit(lb): < or = to 25 pounds Lifting(hrs):   Weight  Limit(lb): < or = to 25 pounds   Comments:      Repetitive Actions   Hands: i.e. Fine Manipulations from Grasping:     Feet: i.e. Operating Foot Controls:     Driving / Operate Machinery:     Provider Name:   Eda Price P.A.-C. Physician Signature:  Physician Name:     Clinic Name / Location: [address] [address] Clinic Phone Number: Dept: [phone]   Appointment Time: 10:00 Am Visit Start Time: 10:11 AM   Check-In Time:  9:58 Am Visit Discharge Time:  11:25 AM   Original-Treating Physician or Chiropractor    Page 2-Insurer/TPA    Page 3-Employer    Page 4-Employee

## 2020-09-26 ENCOUNTER — OCCUPATIONAL MEDICINE (OUTPATIENT)
Dept: URGENT CARE | Facility: PHYSICIAN GROUP | Age: 40
End: 2020-09-26
Payer: COMMERCIAL

## 2020-09-26 VITALS
SYSTOLIC BLOOD PRESSURE: 100 MMHG | BODY MASS INDEX: 25.78 KG/M2 | OXYGEN SATURATION: 96 % | HEIGHT: 64 IN | TEMPERATURE: 97.4 F | RESPIRATION RATE: 16 BRPM | HEART RATE: 58 BPM | DIASTOLIC BLOOD PRESSURE: 68 MMHG | WEIGHT: 151 LBS

## 2020-09-26 DIAGNOSIS — S29.019D ACUTE THORACIC MYOFASCIAL STRAIN, SUBSEQUENT ENCOUNTER: ICD-10-CM

## 2020-09-26 PROCEDURE — 99213 OFFICE O/P EST LOW 20 MIN: CPT | Performed by: EMERGENCY MEDICINE

## 2020-09-26 RX ORDER — IBUPROFEN 200 MG
200 TABLET ORAL EVERY 6 HOURS PRN
COMMUNITY
End: 2020-10-03

## 2020-09-26 ASSESSMENT — ENCOUNTER SYMPTOMS
FEVER: 0
SHORTNESS OF BREATH: 0
BACK PAIN: 1
COUGH: 0

## 2020-09-26 ASSESSMENT — FIBROSIS 4 INDEX: FIB4 SCORE: 0.5

## 2020-09-26 NOTE — LETTER
[facility] [address] [place] [address]  Phone:  [phone] - Fax:  [phone]   [facility] Progress Report and Disability Certification  Date of Service: 2020   No Show:  No  Date / Time of Next Visit: 10/2/2020   Claim Information   Patient Name: Phuc Duvall  Claim Number:     Employer:   [employer] Date of Injury: 2020     Insurer / TPA: Jett  ID / SSN:     Occupation:   Diagnosis: The encounter diagnosis was Acute thoracic myofascial strain, subsequent encounter.    Medical Information   Related to Industrial Injury? Yes    Subjective Complaints:  Date of injury: 2020. Injured at work: yes; notes onset after lifting and twisting holding a box. Previous injury: Similar episode last year. Second job: none. Outside activity: none. Contributing medical illness: none. Severity: mild, moderate, variable, unchanging. Prior treatment:  Ice/heat, OTC med oral and topical. Location: left parathoracic lower. Radiation: none. Numbness/tingling: none. Focal weakness: none. Bowel/bladder dysfunction: none. Fever: none.   Objective Findings: General: Alert, cooperative, no acute distress.  Chest: No deformity, no tenderness to rib cage.  Lungs clear to auscultation, heart regular rate and rhythm.  Musculoskeletal: Left lower thoracic paraspinal tenderness.  Left shoulder normal range of motion.  Skin: Warm, dry, intact without rash.  Vascular: Normal bilateral radial pulses.  Neurological: No gross focal motor deficit, gait normal.   Pre-Existing Condition(s):     Assessment:   Condition Same    Status: Additional Care Required  Permanent Disability:No    Plan:      Diagnostics:      Comments:       Disability Information   Status: Released to Restricted Duty    From:  2020  Through: 10/2/2020 Restrictions are: Temporary   Physical Restrictions   Sitting:    Standing:    [place] hrs/day [place] hrs/day      Squatting:    Walking:    Climbing:    Pushing:  < or = to 2 hrs/day   Pulling:  < or = to 2 hrs/day Other:    Reaching Above Shoulder (L): < or = to 2 hrs/day Reaching Above Shoulder (R):       Reaching Below Shoulder (L):  < or = to 2 hrs/day Reaching Below Shoulder (R):      Not to exceed Weight Limits   Carrying(hrs):   Weight Limit(lb): < or = to 25 pounds Lifting(hrs):   Weight  Limit(lb): < or = to 25 pounds   Comments:      Repetitive Actions   Hands: i.e. Fine Manipulations from Grasping:     Feet: i.e. Operating Foot Controls:     Driving / Operate Machinery:     Provider Name:   Kwabena Crowell M.D. Physician Signature:  Physician Name:     Clinic Name / Location: [address] [address] Clinic Phone Number: Dept: [phone]   Appointment Time: 10:00 Am Visit Start Time: 10:09 AM   Check-In Time:  9:51 Am Visit Discharge Time:  10:53 AM   Original-Treating Physician or Chiropractor    Page 2-Insurer/TPA    Page 3-Employer    Page 4-Employee

## 2020-09-26 NOTE — PROGRESS NOTES
"Subjective:      Phuc Duvall is a 40 y.o. male who presents with Back Pain (WC FV Back Px x 2 Wks)      Date of injury: 09/09/2020. Injured at work: yes; notes onset after lifting and twisting holding a box. Previous injury: Similar episode last year. Second job: none. Outside activity: none. Contributing medical illness: none. Severity: mild, moderate, variable, unchanging. Prior treatment:  Ice/heat, OTC med oral and topical. Location: left parathoracic lower. Radiation: none. Numbness/tingling: none. Focal weakness: none. Bowel/bladder dysfunction: none. Fever: none.     Back Pain  Pertinent negatives include no fever.       Review of Systems   Constitutional: Negative for fever and malaise/fatigue.   Respiratory: Negative for cough and shortness of breath.    Musculoskeletal: Positive for back pain.   Skin: Negative for rash.     PMH: No other pertinent past medical history to this problem  MEDS: Medications were reviewed in EMR  ALLERGIES: Allergies were reviewed in EMR  SOCHX: Works as a   FH: No pertinent family history to this problem       Objective:     /68 (BP Location: Left arm, Patient Position: Sitting, BP Cuff Size: Adult)   Pulse (!) 58   Temp 36.3 °C (97.4 °F) (Temporal)   Resp 16   Ht 1.626 m (5' 4\")   Wt 68.5 kg (151 lb)   SpO2 96%   BMI 25.92 kg/m²      Physical Exam    General: Alert, cooperative, no acute distress.  Chest: No deformity, no tenderness to rib cage.  Lungs clear to auscultation, heart regular rate and rhythm.  Musculoskeletal: Left lower thoracic paraspinal tenderness.  Left shoulder normal range of motion.  Skin: Warm, dry, intact without rash.  Vascular: Normal bilateral radial pulses.  Neurological: No gross focal motor deficit, gait normal.       Assessment/Plan:        1. Acute thoracic myofascial strain, subsequent encounter  D39 completed; reduce frequency of activity.  Continue topical, oral therapy as needed.  Referral to occupational health for " possible physical therapy, trigger point injection.

## 2020-10-03 ENCOUNTER — OCCUPATIONAL MEDICINE (OUTPATIENT)
Dept: URGENT CARE | Facility: CLINIC | Age: 40
End: 2020-10-03
Payer: COMMERCIAL

## 2020-10-03 VITALS
TEMPERATURE: 97.1 F | OXYGEN SATURATION: 96 % | SYSTOLIC BLOOD PRESSURE: 128 MMHG | HEIGHT: 64 IN | DIASTOLIC BLOOD PRESSURE: 74 MMHG | RESPIRATION RATE: 14 BRPM | HEART RATE: 72 BPM | BODY MASS INDEX: 25.78 KG/M2 | WEIGHT: 151 LBS

## 2020-10-03 DIAGNOSIS — S29.019D ACUTE THORACIC MYOFASCIAL STRAIN, SUBSEQUENT ENCOUNTER: ICD-10-CM

## 2020-10-03 PROCEDURE — 99214 OFFICE O/P EST MOD 30 MIN: CPT | Performed by: NURSE PRACTITIONER

## 2020-10-03 ASSESSMENT — FIBROSIS 4 INDEX: FIB4 SCORE: 0.5

## 2020-10-03 NOTE — PROGRESS NOTES
"  Chief Complaint   Patient presents with   • Follow-Up     Doi 09/09/20- back- same        HISTORY OF PRESENT ILLNESS: Patient is a 40 y.o. male who presents to urgent care today with a work comp follow up. DOI 9/9/2020: Patient presents for a work comp follow-up.  Notes he injured his left mid back after lifting and twisting, carrying a box while at work.  Pain is left-sided, mid back, no radiation.  Denies any numbness, tingling, unilateral weakness, bowel or bladder dysfunction.  He has tried ice and heat therapy as well as OTC medications.  He had a previous episode last year.  He was last seen 9/26/20 in urgent care, at that time he was given a referral to follow-up with occupational medicine due to lack of improvement but was unaware need to make appt. Today he denies improvement or worsening. He has been tolerating work restrictions.         PMH: No pertinent past medical history to this problem  MEDS: Medications were reviewed in Epic  ALLERGIES: Allergies were reviewed in Epic  FH: No pertinent family history to this problem      ROS:  Review of Systems   Constitutional: Negative for fever, chills, weight loss, malaise, and fatigue.   HENT: Negative for ear pain, nosebleeds, congestion, sore throat and neck pain.    Eyes: Negative for vision changes.   Neuro: Negative for headache, sensory changes, weakness, seizure, LOC.   Cardiovascular: Negative for chest pain, palpitations, orthopnea and leg swelling.   Respiratory: Negative for cough, sputum production, shortness of breath and wheezing.   Gastrointestinal: Negative for abdominal pain, nausea, vomiting or diarrhea.   Genitourinary: Negative for dysuria, urgency and frequency.  Musculoskeletal: Positive for thoracic back pain. Negative for falls, neck pain, joint pain, myalgias.   Skin: Negative for rash, diaphoresis.     Exam:  /74   Pulse 72   Temp 36.2 °C (97.1 °F) (Temporal)   Resp 14   Ht 1.626 m (5' 4\")   Wt 68.5 kg (151 lb)   SpO2 96% "   General: well-nourished, well-developed male in NAD  Head: normocephalic, atraumatic  Eyes: PERRLA, no conjunctival injection, acuity grossly intact, lids normal.  Ears: normal shape and symmetry, no tenderness, no discharge. External canals are without any significant edema or erythema. Tympanic membranes are without any inflammation, no effusion. Gross auditory acuity is intact.  Nose: symmetrical without tenderness, no discharge.  Mouth/Throat: reasonable hygiene, no erythema, exudates or tonsillar enlargement.  Neck: no masses, range of motion within normal limits, no tracheal deviation. No obvious thyroid enlargement.   Lymph: no cervical adenopathy. No supraclavicular adenopathy.   Neuro: alert and oriented. Cranial nerves 1-12 grossly intact. No sensory deficit.   Cardiovascular: regular rate and rhythm. No edema.  Pulmonary: no distress. Chest is symmetrical with respiration, no wheezes, crackles, or rhonchi.   Musculoskeletal: no clubbing, appropriate muscle tone. No midline spinal tenderness or deformity. There is soft tissue tenderness to bilateral thoracic paraspinal regions. Spine has good ROM in all directions. Strength bilateral upper and lower extremities 5/5, N/V intact. Gait steady.   Skin: warm, dry, intact, no clubbing, no cyanosis, no rashes.   Psych: appropriate mood, affect, judgement.         Assessment/Plan:  1. Acute thoracic myofascial strain, subsequent encounter  Diclofenac Sodium (VOLTAREN) 1 % Gel       Voltaren (DC NSAIDS with h/o GERD), Tylenol, ice and heat therapy, stretching, F/U with [facility] 9/8/20 (appt made for patient).   Supportive care, differential diagnoses, and indications for immediate follow-up discussed with patient.   Pathogenesis of diagnosis discussed including typical length and natural progression.   Instructed to return to clinic or nearest emergency department sooner for any change in condition, further concerns, or worsening of symptoms.  Patient states  understanding of the plan of care and discharge instructions.          Please note that this dictation was created using voice recognition software. I have made every reasonable attempt to correct obvious errors, but I expect that there are errors of grammar and possibly content that I did not discover before finalizing the note.      ABENA Flower.

## 2020-10-03 NOTE — LETTER
[facility] [address] [place] [address]  Phone:  [phone] - Fax:  [phone]   [facility] Progress Report and Disability Certification  Date of Service: 10/3/2020   No Show:  No  Date / Time of Next Visit: 10/08/20 @ 8:45 [facility]   Claim Information   Patient Name: Phuc Duvall  Claim Number:     Employer:   [employer] Date of Injury: 9/9/2020     Insurer / TPA: Jett  ID / SSN:     Occupation:   Diagnosis: The encounter diagnosis was Acute thoracic myofascial strain, subsequent encounter.    Medical Information   Related to Industrial Injury? Yes    Subjective Complaints:  DOI 9/9/2020: Patient presents for a work comp follow-up.  Notes he injured his left mid back after lifting and twisting, carrying a box while at work.  Pain is left-sided, mid back, no radiation.  Denies any numbness, tingling, unilateral weakness, bowel or bladder dysfunction.  He has tried ice and heat therapy as well as OTC medications.  He had a previous episode last year.  He was last seen 9/26/20 in urgent care, at that time he was given a referral to follow-up with occupational medicine due to lack of improvement but was unaware need to make appt. Today he denies improvement or worsening. He has been tolerating work restrictions.    Objective Findings: A/Ox4. NAD. No midline spinal tenderness or deformity. There is soft tissue tenderness to bilateral thoracic paraspinal regions. Spine has good ROM in all directions. Strength bilateral upper and lower extremities 5/5, N/V intact. Gait steady.    Pre-Existing Condition(s): Previous similar episode 1 year ago   Assessment:   Condition Same    Status: Additional Care Required  Permanent Disability:No    Plan: Medication  Comments:Voltaren, Tylenol, ice and heat therapy, stretching, F/U with [facility] 9/8/20    Diagnostics:   Comments:N/A    Comments:       Disability Information   Status: Released to Restricted  Duty    From:  10/3/2020  Through: 10/10/2020 Restrictions are: Temporary   Physical Restrictions   Sitting:    Standing:    Stooping:    Bendin hrs/day   Squatting:    Walking:    Climbing:    Pushing:  < or = to 1 hr/day   Pulling:  < or = to 1 hr/day Other:    Reaching Above Shoulder (L):   Reaching Above Shoulder (R):       Reaching Below Shoulder (L):    Reaching Below Shoulder (R):      Not to exceed Weight Limits   Carrying(hrs):   Weight Limit(lb): < or = to 25 pounds Lifting(hrs):   Weight  Limit(lb): < or = to 25 pounds   Comments:      Repetitive Actions   Hands: i.e. Fine Manipulations from Grasping:     Feet: i.e. Operating Foot Controls:     Driving / Operate Machinery:     Provider Name:   NIMA Flower Physician Signature:  Physician Name:     Clinic Name / Location: [address] [address] Clinic Phone Number: Dept: [phone]   Appointment Time: 11:30 Am Visit Start Time: 11:45 AM   Check-In Time:  11:27 Am Visit Discharge Time: 12:13 PM   Original-Treating Physician or Chiropractor    Page 2-Insurer/TPA    Page 3-Employer    Page 4-Employee

## 2020-10-08 ENCOUNTER — OCCUPATIONAL MEDICINE (OUTPATIENT)
Dept: OCCUPATIONAL MEDICINE | Facility: CLINIC | Age: 40
End: 2020-10-08
Payer: COMMERCIAL

## 2020-10-08 VITALS
HEART RATE: 63 BPM | BODY MASS INDEX: 26.29 KG/M2 | SYSTOLIC BLOOD PRESSURE: 110 MMHG | TEMPERATURE: 96.5 F | OXYGEN SATURATION: 95 % | DIASTOLIC BLOOD PRESSURE: 68 MMHG | HEIGHT: 64 IN | WEIGHT: 154 LBS

## 2020-10-08 DIAGNOSIS — S29.019D ACUTE THORACIC MYOFASCIAL STRAIN, SUBSEQUENT ENCOUNTER: ICD-10-CM

## 2020-10-08 PROCEDURE — 99213 OFFICE O/P EST LOW 20 MIN: CPT | Performed by: NURSE PRACTITIONER

## 2020-10-08 RX ORDER — TIZANIDINE 4 MG/1
4 TABLET ORAL EVERY 6 HOURS PRN
Qty: 30 TAB | Refills: 0 | Status: SHIPPED | OUTPATIENT
Start: 2020-10-08 | End: 2020-10-22 | Stop reason: SDUPTHER

## 2020-10-08 ASSESSMENT — FIBROSIS 4 INDEX: FIB4 SCORE: 0.5

## 2020-10-08 ASSESSMENT — ENCOUNTER SYMPTOMS
CONSTITUTIONAL NEGATIVE: 1
PSYCHIATRIC NEGATIVE: 1
BACK PAIN: 1
NEUROLOGICAL NEGATIVE: 1
RESPIRATORY NEGATIVE: 1
MYALGIAS: 1
CARDIOVASCULAR NEGATIVE: 1

## 2020-10-08 NOTE — PROGRESS NOTES
"Subjective:      Phuc Duvall is a 40 y.o. male who presents with Other (WC DOI 9/9/20 mid back feeling better, room 17)      DOI 9/9/2020: Patient presents with: Work-Related Injury: mid back pain x1 wk.  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  Reports mild improvement.  He states that the pain is most noticeable in the morning or after sitting for long periods of time.  He denies saddle anesthesia, leg weakness, or bowel or bladder changes.  He has been using Tylenol, ibuprofen, and diclofenac gel with moderate relief of symptoms.  He has been doing some stretching exercises with moderate improvement.  He has been tolerating his work restrictions without difficulty.  Will order physical therapy at this session.  Plan of care discussed with patient.     HPI    Review of Systems   Constitutional: Negative.    Respiratory: Negative.    Cardiovascular: Negative.    Musculoskeletal: Positive for back pain and myalgias.   Skin: Negative.    Neurological: Negative.    Psychiatric/Behavioral: Negative.         ROS: All systems were reviewed on intake form, form was reviewed and signed. See scanned documents in media. Pertinent positives and negatives included in HPI.    PMH: No pertinent past medical history to this problem  MEDS: Medications were reviewed in Epic  ALLERGIES: No Known Allergies  SOCHX: Works as a  at [employer]  FH: No pertinent family history to this problem   Objective:     /68   Pulse 63   Temp 35.8 °C (96.5 °F)   Ht 1.626 m (5' 4\")   Wt 69.9 kg (154 lb)   SpO2 95%   BMI 26.43 kg/m²      Physical Exam  Constitutional:       General: He is not in acute distress.     Appearance: Normal appearance. He is not ill-appearing.   Cardiovascular:      Rate and Rhythm: Normal rate and regular rhythm.      Pulses: Normal pulses.   Pulmonary:      Effort: Pulmonary effort is normal. "   Musculoskeletal: Normal range of motion.         General: Tenderness present. No swelling, deformity or signs of injury.   Skin:     General: Skin is warm and dry.      Capillary Refill: Capillary refill takes less than 2 seconds.      Findings: No bruising or erythema.   Neurological:      General: No focal deficit present.      Mental Status: He is alert and oriented to person, place, and time.      Cranial Nerves: No cranial nerve deficit.      Sensory: No sensory deficit.      Motor: No weakness.      Gait: Gait normal.      Deep Tendon Reflexes: Reflexes normal.   Psychiatric:         Mood and Affect: Mood normal.         Behavior: Behavior normal.         Thoracic back: No obvious deformities or discolorations noted. Mild TTP to back T6-T8 area bilaterally along paraspinous muscles.  No ecchymosis, abrasion or rash. FROM with pain while stooping, reaching, resisted upper extremity movements.   Neg pain to low back or neck.   5/5.        Assessment/Plan:        1. Acute thoracic myofascial strain, subsequent encounter    - tizanidine (ZANAFLEX) 4 MG Tab; Take 1 Tab by mouth every 6 hours as needed.  Dispense: 30 Tab; Refill: 0  - REFERRAL TO PHYSICAL THERAPY Reason for Therapy: Eval/Treat/Report      Follow-up in 2 weeks   Restricted duty   Physical therapy referral placed   Take tizanidine as prescribed DO NOT DRIVE or WORK while taking this medication   Continue with OTC Tylenol/ibuprofen as needed   Continue with OTC Voltaren gel as needed   Continue with gentle range of motion and stretching exercises as tolerated

## 2020-10-08 NOTE — LETTER
[address],   [address] [place] [address]  Phone:  [phone] - Fax:  [phone]   Occupational Health [facility] Progress Report and Disability Certification  Date of Service: 10/8/2020   No Show:  No  Date / Time of Next Visit: 10/22/2020 @ 8:45 AM   Claim Information   Patient Name: Phuc Duvall  Claim Number:     Employer:   [employer] Date of Injury: 9/9/2020     Insurer / TPA: Jett  ID / SSN:     Occupation:   Diagnosis: The encounter diagnosis was Acute thoracic myofascial strain, subsequent encounter.    Medical Information   Related to Industrial Injury? Yes    Subjective Complaints:  DOI 9/9/2020: Patient presents with: Work-Related Injury: mid back pain x1 wk.  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  Reports mild improvement.  He states that the pain is most noticeable in the morning or after sitting for long periods of time.  He denies saddle anesthesia, leg weakness, or bowel or bladder changes.  He has been using Tylenol, ibuprofen, and diclofenac gel with moderate relief of symptoms.  He has been doing some stretching exercises with moderate improvement.  He has been tolerating his work restrictions without difficulty.  Will order physical therapy at this session.  Plan of care discussed with patient.   Objective Findings: Thoracic back: No obvious deformities or discolorations noted. Mild TTP to back T6-T8 area bilaterally along paraspinous muscles.  No ecchymosis, abrasion or rash. FROM with pain while stooping, reaching, resisted upper extremity movements.   Neg pain to low back or neck.   5/5.    Pre-Existing Condition(s):     Assessment:   Condition Improved    Status: Additional Care Required  Permanent Disability:No    Plan: Medication (NOT at Work)PT    Diagnostics:      Comments:  Follow-up in 2 weeks  Restricted  duty  Physical therapy referral placed  Take tizanidine as prescribed DO NOT DRIVE or WORK while taking this medication  Continue with OTC Tylenol/ibuprofen as needed  Continue with OTC Voltaren gel as needed  Continu  e with gentle range of motion and stretching exercises as tolerated      Disability Information   Status: Released to Restricted Duty    From:  10/8/2020  Through: 10/22/2020 Restrictions are: Temporary   Physical Restrictions   Sitting:    Standing:    Stooping:  < or = to 2 hrs/day Bending:      Squatting:    Walking:    Climbing:    Pushing:      Pulling:    Other:    Reaching Above Shoulder (L):   Reaching Above Shoulder (R):       Reaching Below Shoulder (L):    Reaching Below Shoulder (R):      Not to exceed Weight Limits   Carrying(hrs):   Weight Limit(lb): < or = to 25 pounds Lifting(hrs):   Weight  Limit(lb): < or = to 25 pounds   Comments:      Repetitive Actions   Hands: i.e. Fine Manipulations from Grasping:     Feet: i.e. Operating Foot Controls:     Driving / Operate Machinery:     Provider Name:   NIMA Schaefer Physician Signature:  Physician Name:     Clinic Name / Location: [address],   [address] [place] [address] [place] [address] Clinic Phone Number: Dept: [phone]   Appointment Time: 8:45 Am Visit Start Time: 8:51 AM   Check-In Time:  8:44 Am Visit Discharge Time: 9:21 AM    Original-Treating Physician or Chiropractor    Page 2-Insurer/TPA    Page 3-Employer    Page 4-Employee

## 2020-10-22 ENCOUNTER — OCCUPATIONAL MEDICINE (OUTPATIENT)
Dept: OCCUPATIONAL MEDICINE | Facility: CLINIC | Age: 40
End: 2020-10-22
Payer: COMMERCIAL

## 2020-10-22 VITALS
WEIGHT: 165 LBS | RESPIRATION RATE: 12 BRPM | DIASTOLIC BLOOD PRESSURE: 60 MMHG | TEMPERATURE: 98.9 F | SYSTOLIC BLOOD PRESSURE: 110 MMHG | BODY MASS INDEX: 28.17 KG/M2 | HEART RATE: 78 BPM | OXYGEN SATURATION: 100 % | HEIGHT: 64 IN

## 2020-10-22 DIAGNOSIS — S29.019D ACUTE THORACIC MYOFASCIAL STRAIN, SUBSEQUENT ENCOUNTER: ICD-10-CM

## 2020-10-22 PROCEDURE — 99213 OFFICE O/P EST LOW 20 MIN: CPT | Performed by: NURSE PRACTITIONER

## 2020-10-22 RX ORDER — METHYLPREDNISOLONE 4 MG/1
TABLET ORAL
Qty: 21 TAB | Refills: 0 | Status: SHIPPED | OUTPATIENT
Start: 2020-10-22

## 2020-10-22 RX ORDER — TIZANIDINE 4 MG/1
4 TABLET ORAL EVERY 6 HOURS PRN
Qty: 30 TAB | Refills: 0 | Status: SHIPPED | OUTPATIENT
Start: 2020-10-22

## 2020-10-22 ASSESSMENT — ENCOUNTER SYMPTOMS
PSYCHIATRIC NEGATIVE: 1
RESPIRATORY NEGATIVE: 1
BACK PAIN: 1
MYALGIAS: 1
NEUROLOGICAL NEGATIVE: 1
CARDIOVASCULAR NEGATIVE: 1

## 2020-10-22 ASSESSMENT — FIBROSIS 4 INDEX: FIB4 SCORE: 0.5

## 2020-10-22 ASSESSMENT — PAIN SCALES - GENERAL: PAINLEVEL: 3=SLIGHT PAIN

## 2020-10-22 NOTE — LETTER
[address],   [address] [place] [address]  Phone:  [phone] - Fax:  [phone]   Occupational Health [facility] Progress Report and Disability Certification  Date of Service: 10/22/2020   No Show:  No  Date / Time of Next Visit: 11/12/2020 @ 10:15 AM   Claim Information   Patient Name: Phuc Duvall  Claim Number:     Employer:   [employer] Date of Injury: 9/9/2020     Insurer / TPA: Jett  ID / SSN:     Occupation:   Diagnosis: The encounter diagnosis was Acute thoracic myofascial strain, subsequent encounter.    Medical Information   Related to Industrial Injury? Yes    Subjective Complaints:  DOI 9/9/2020: Patient presents with: Work-Related Injury: mid back pain x1 wk.  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  A very mild improvement.  He notes that the pain is intermittent and isolated to the mid back.  Denies saddle anesthesia, leg weakness, or bowel and bladder changes.  He has been using muscle relaxer, NSAIDs, topical ointments, and a massager on his back with very minimal improvement.  He has been tolerating light duty without difficulty.  He has not scheduled his physical therapy appointments, states that he was told to call back at the end of the day.  He was strongly encouraged to call to schedule his appointment as this will increase his ability to heal.  He verbalizes understanding.  Plan of care discussed with patient.   Objective Findings: Thoracic mid back: No obvious deformities or discolorations noted.  Very mild TTP to back T6-T8 area bilaterally along paraspinous muscles.  No ecchymosis, abrasion or rash. FROM.   Neg pain to low back or neck.   5/5.    Pre-Existing Condition(s):     Assessment:   Condition Same    Status: Additional Care Required  Permanent Disability:No    Plan: PTMedication (NOT at Work)Medication       Diagnostics:      Comments:  Follow-up in 3 weeks   Restricted duty   Physical therapy appointments pending scheduling  Take Medrol as prescribed   Take tizanidine as prescribed DO NOT DRIVE or WORK while taking this medication   Continue with OTC Tylenol/ibuprofen as needed   C  ontinue with OTC Voltaren gel as needed   Continue with gentle range of motion and stretching exercises as tolerated       Disability Information   Status: Released to Restricted Duty    From:  10/22/2020  Through: 11/12/2020 Restrictions are: Temporary   Physical Restrictions   Sitting:    Standing:    Stooping:  < or = to 2 hrs/day Bending:      Squatting:    Walking:    Climbing:    Pushing:      Pulling:    Other:    Reaching Above Shoulder (L):   Reaching Above Shoulder (R):       Reaching Below Shoulder (L):    Reaching Below Shoulder (R):      Not to exceed Weight Limits   Carrying(hrs):   Weight Limit(lb): < or = to 25 pounds Lifting(hrs):   Weight  Limit(lb): < or = to 25 pounds   Comments:      Repetitive Actions   Hands: i.e. Fine Manipulations from Grasping:     Feet: i.e. Operating Foot Controls:     Driving / Operate Machinery:     Provider Name:   NIMA Schaefer Physician Signature:  Physician Name:     Clinic Name / Location: [address],   [address] [place] [address] Clinic Phone Number: Dept: [phone]   Appointment Time: 8:45 Am Visit Start Time: 8:53 AM   Check-In Time:  8:49 Am Visit Discharge Time:  9:27 AM    Original-Treating Physician or Chiropractor    Page 2-Insurer/TPA    Page 3-Employer    Page 4-Employee

## 2020-10-22 NOTE — PROGRESS NOTES
"Subjective:      Phuc Duvall is a 40 y.o. male who presents with Follow-Up (WC DOI 9/9/20 mid back - better - RM 16)      DOI 9/9/2020: Patient presents with: Work-Related Injury: mid back pain x1 wk.  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  A very mild improvement.  He notes that the pain is intermittent and isolated to the mid back.  Denies saddle anesthesia, leg weakness, or bowel and bladder changes.  He has been using muscle relaxer, NSAIDs, topical ointments, and a massager on his back with very minimal improvement.  He has been tolerating light duty without difficulty.  He has not scheduled his physical therapy appointments, states that he was told to call back at the end of the day.  He was strongly encouraged to call to schedule his appointment as this will increase his ability to heal.  He verbalizes understanding.  Plan of care discussed with patient.     HPI    Review of Systems   Respiratory: Negative.    Cardiovascular: Negative.    Musculoskeletal: Positive for back pain and myalgias.   Skin: Negative.    Neurological: Negative.    Psychiatric/Behavioral: Negative.         SOCHX: Works as a  at [employer]  FH: No pertinent family history to this problem   Objective:     /60   Pulse 78   Temp 37.2 °C (98.9 °F) (Temporal)   Resp 12   Ht 1.626 m (5' 4\")   Wt 74.8 kg (165 lb)   SpO2 100%   BMI 28.32 kg/m²      Physical Exam  Constitutional:       General: He is not in acute distress.     Appearance: Normal appearance. He is not ill-appearing.   Cardiovascular:      Rate and Rhythm: Normal rate and regular rhythm.      Pulses: Normal pulses.   Pulmonary:      Effort: Pulmonary effort is normal.   Musculoskeletal: Normal range of motion.         General: Tenderness present. No swelling, deformity or signs of injury.   Skin:     General: Skin is warm and dry.      Capillary Refill: " Capillary refill takes less than 2 seconds.      Findings: No bruising or erythema.   Neurological:      General: No focal deficit present.      Mental Status: He is alert and oriented to person, place, and time.      Cranial Nerves: No cranial nerve deficit.      Sensory: No sensory deficit.      Motor: No weakness.      Gait: Gait normal.      Deep Tendon Reflexes: Reflexes normal.   Psychiatric:         Mood and Affect: Mood normal.         Behavior: Behavior normal.         Thoracic mid back: No obvious deformities or discolorations noted.  Very mild TTP to back T6-T8 area bilaterally along paraspinous muscles.  No ecchymosis, abrasion or rash. FROM.   Neg pain to low back or neck.   5/5.        Assessment/Plan:        1. Acute thoracic myofascial strain, subsequent encounter    - methylPREDNISolone (MEDROL DOSEPAK) 4 MG Tablet Therapy Pack; As directed on the packaging label.  Dispense: 21 Tab; Refill: 0  - tizanidine (ZANAFLEX) 4 MG Tab; Take 1 Tab by mouth every 6 hours as needed.  Dispense: 30 Tab; Refill: 0      Follow-up in 3 weeks   Restricted duty   Physical therapy appointments pending scheduling   Take Medrol as prescribed   Take tizanidine as prescribed DO NOT DRIVE or WORK while taking this medication   Continue with OTC Tylenol/ibuprofen as needed   Continue with OTC Voltaren gel as needed   Continue with gentle range of motion and stretching exercises as tolerated